# Patient Record
Sex: FEMALE | Race: BLACK OR AFRICAN AMERICAN | Employment: FULL TIME | URBAN - METROPOLITAN AREA
[De-identification: names, ages, dates, MRNs, and addresses within clinical notes are randomized per-mention and may not be internally consistent; named-entity substitution may affect disease eponyms.]

---
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
## 2021-01-06 ENCOUNTER — OFFICE VISIT (OUTPATIENT)
Dept: FAMILY MEDICINE CLINIC | Age: 21
End: 2021-01-06
Payer: COMMERCIAL

## 2021-01-06 VITALS
SYSTOLIC BLOOD PRESSURE: 134 MMHG | BODY MASS INDEX: 43.36 KG/M2 | HEART RATE: 76 BPM | DIASTOLIC BLOOD PRESSURE: 84 MMHG | TEMPERATURE: 97.3 F | OXYGEN SATURATION: 99 % | HEIGHT: 64 IN | RESPIRATION RATE: 18 BRPM | WEIGHT: 254 LBS

## 2021-01-06 DIAGNOSIS — R73.03 PREDIABETES: ICD-10-CM

## 2021-01-06 DIAGNOSIS — E66.01 CLASS 3 SEVERE OBESITY DUE TO EXCESS CALORIES WITHOUT SERIOUS COMORBIDITY WITH BODY MASS INDEX (BMI) OF 40.0 TO 44.9 IN ADULT (HCC): ICD-10-CM

## 2021-01-06 DIAGNOSIS — E66.01 CLASS 3 SEVERE OBESITY WITHOUT SERIOUS COMORBIDITY WITH BODY MASS INDEX (BMI) OF 40.0 TO 44.9 IN ADULT, UNSPECIFIED OBESITY TYPE (HCC): ICD-10-CM

## 2021-01-06 DIAGNOSIS — Z13.220 SCREENING FOR CHOLESTEROL LEVEL: ICD-10-CM

## 2021-01-06 DIAGNOSIS — E28.2 PCOS (POLYCYSTIC OVARIAN SYNDROME): Primary | ICD-10-CM

## 2021-01-06 LAB
ALBUMIN SERPL-MCNC: 3.9 G/DL (ref 3.5–5.2)
ALP BLD-CCNC: 56 U/L (ref 35–104)
ALT SERPL-CCNC: 9 U/L (ref 0–32)
ANION GAP SERPL CALCULATED.3IONS-SCNC: 11 MMOL/L (ref 7–16)
AST SERPL-CCNC: 13 U/L (ref 0–31)
BASOPHILS ABSOLUTE: 0.06 E9/L (ref 0–0.2)
BASOPHILS RELATIVE PERCENT: 0.6 % (ref 0–2)
BILIRUB SERPL-MCNC: 0.2 MG/DL (ref 0–1.2)
BUN BLDV-MCNC: 9 MG/DL (ref 6–20)
CALCIUM SERPL-MCNC: 9.7 MG/DL (ref 8.6–10.2)
CHLORIDE BLD-SCNC: 105 MMOL/L (ref 98–107)
CHOLESTEROL, FASTING: 158 MG/DL (ref 0–199)
CO2: 24 MMOL/L (ref 22–29)
CREAT SERPL-MCNC: 0.5 MG/DL (ref 0.5–1)
EOSINOPHILS ABSOLUTE: 0.1 E9/L (ref 0.05–0.5)
EOSINOPHILS RELATIVE PERCENT: 1 % (ref 0–6)
GFR AFRICAN AMERICAN: >60
GFR NON-AFRICAN AMERICAN: >60 ML/MIN/1.73
GLUCOSE BLD-MCNC: 107 MG/DL (ref 74–99)
HBA1C MFR BLD: 6.7 % (ref 4–5.6)
HCT VFR BLD CALC: 38.4 % (ref 34–48)
HDLC SERPL-MCNC: 52 MG/DL
HEMOGLOBIN: 11.6 G/DL (ref 11.5–15.5)
IMMATURE GRANULOCYTES #: 0.02 E9/L
IMMATURE GRANULOCYTES %: 0.2 % (ref 0–5)
LDL CHOLESTEROL CALCULATED: 92 MG/DL (ref 0–99)
LYMPHOCYTES ABSOLUTE: 3.16 E9/L (ref 1.5–4)
LYMPHOCYTES RELATIVE PERCENT: 30.5 % (ref 20–42)
MCH RBC QN AUTO: 23.3 PG (ref 26–35)
MCHC RBC AUTO-ENTMCNC: 30.2 % (ref 32–34.5)
MCV RBC AUTO: 77.3 FL (ref 80–99.9)
MONOCYTES ABSOLUTE: 0.54 E9/L (ref 0.1–0.95)
MONOCYTES RELATIVE PERCENT: 5.2 % (ref 2–12)
NEUTROPHILS ABSOLUTE: 6.48 E9/L (ref 1.8–7.3)
NEUTROPHILS RELATIVE PERCENT: 62.5 % (ref 43–80)
PDW BLD-RTO: 15.9 FL (ref 11.5–15)
PLATELET # BLD: 341 E9/L (ref 130–450)
PMV BLD AUTO: 11.8 FL (ref 7–12)
POTASSIUM SERPL-SCNC: 4.1 MMOL/L (ref 3.5–5)
RBC # BLD: 4.97 E12/L (ref 3.5–5.5)
SODIUM BLD-SCNC: 140 MMOL/L (ref 132–146)
TOTAL PROTEIN: 7.9 G/DL (ref 6.4–8.3)
TRIGLYCERIDE, FASTING: 72 MG/DL (ref 0–149)
TSH SERPL DL<=0.05 MIU/L-ACNC: 1 UIU/ML (ref 0.27–4.2)
VLDLC SERPL CALC-MCNC: 14 MG/DL
WBC # BLD: 10.4 E9/L (ref 4.5–11.5)

## 2021-01-06 PROCEDURE — 1036F TOBACCO NON-USER: CPT | Performed by: FAMILY MEDICINE

## 2021-01-06 PROCEDURE — G8484 FLU IMMUNIZE NO ADMIN: HCPCS | Performed by: FAMILY MEDICINE

## 2021-01-06 PROCEDURE — G8417 CALC BMI ABV UP PARAM F/U: HCPCS | Performed by: FAMILY MEDICINE

## 2021-01-06 PROCEDURE — 99214 OFFICE O/P EST MOD 30 MIN: CPT | Performed by: FAMILY MEDICINE

## 2021-01-06 PROCEDURE — G8427 DOCREV CUR MEDS BY ELIG CLIN: HCPCS | Performed by: FAMILY MEDICINE

## 2021-01-06 RX ORDER — METFORMIN HYDROCHLORIDE 500 MG/1
500 TABLET, EXTENDED RELEASE ORAL 2 TIMES DAILY
Qty: 60 TABLET | Refills: 5 | Status: SHIPPED
Start: 2021-01-06 | End: 2021-05-25 | Stop reason: SDUPTHER

## 2021-01-06 ASSESSMENT — PATIENT HEALTH QUESTIONNAIRE - PHQ9
SUM OF ALL RESPONSES TO PHQ QUESTIONS 1-9: 0

## 2021-01-06 ASSESSMENT — ENCOUNTER SYMPTOMS
CONSTIPATION: 0
TROUBLE SWALLOWING: 0
SINUS PAIN: 0
SORE THROAT: 0
VOMITING: 0
SINUS PRESSURE: 0
SHORTNESS OF BREATH: 0
COUGH: 0
ABDOMINAL PAIN: 0
NAUSEA: 0
DIARRHEA: 1
EYES NEGATIVE: 1
APNEA: 0

## 2021-01-06 NOTE — PROGRESS NOTES
Subjective:  22 y.o. y/o female presents for f/u obesity and prediabetes. PCOS: Mayo Mejia APRN, reviewed OV 6/20, U/S done and OK, progesterone challenge OK, taking metformin  BID  Appt tomorrow   No bleeding for 4 months then started in November and haven't stopped, +heavy  Feels very sleepy  Not taking the iron  Diagnosed with COVID 12/22, taste and smell still altered, no fevers, no SOA  Works as home health aide   Has a boyfriend  Lives at home alone    PMH, [address], and FH were reviewed and otherwise documented in chart. Review of Systems   Constitutional: Positive for appetite change and fatigue. Negative for activity change, chills, diaphoresis, fever and unexpected weight change. HENT: Positive for hearing loss (issue in family). Negative for congestion, ear pain, sinus pressure, sinus pain, sore throat and trouble swallowing. Eyes: Negative. Respiratory: Negative for apnea, cough and shortness of breath. Cardiovascular: Negative for chest pain, palpitations and leg swelling. Gastrointestinal: Positive for diarrhea (with metformin). Negative for abdominal pain, constipation, nausea and vomiting. Endocrine: Positive for polydipsia. Negative for cold intolerance, heat intolerance, polyphagia and polyuria. Genitourinary: Positive for menstrual problem. Negative for difficulty urinating, dysuria and hematuria. Musculoskeletal: Negative. Skin: Negative. Allergic/Immunologic: Positive for food allergies (plums, throat closes). Negative for environmental allergies and immunocompromised state. Neurological: Positive for headaches (occ). Negative for dizziness, tremors, seizures, weakness, light-headedness and numbness. Hematological: Negative for adenopathy. Does not bruise/bleed easily. Psychiatric/Behavioral: Negative.         Objective: /84   Pulse 76   Temp 97.3 °F (36.3 °C) (Temporal)   Resp 18   Ht 5' 4\" (1.626 m)   Wt 254 lb (115.2 kg)   LMP 11/27/2020 (Exact Date)   SpO2 99%   BMI 43.60 kg/m²   Body mass index is 43.6 kg/m². General:  Patient alert and oriented x 3, NAD, pleasant, overweight-appearing  HEENT:  Atraumatic, normocephalic, pupils equal and normal, EOMI, clear conjunctiva, +mask  Neck:  Supple, no goiter, no carotid bruits, no LAD  Lungs:  CTA B, symmetric breath sounds, no resp distress  Heart:  RRR, no murmurs, gallops or rubs  Abdomen:  Soft/nt/nd, + bowel sounds  Extremities:  No clubbing, cyanosis or edema, DTRs normal b/l  Skin: unremarkable      Assessment/Plan:  Pavel Andre was seen today for menstrual problem. Diagnoses and all orders for this visit:    PCOS (polycystic ovarian syndrome), uncontrolled  -     metFORMIN (GLUCOPHAGE-XR) 500 MG extended release tablet; Take 1 tablet by mouth 2 times daily  + Bleeding for 2 months, not wanting OCP d/t desire for pregnancy  + Keep appt with ob/gyn tomorrow    Prediabetes, due for lab  -     metFORMIN (GLUCOPHAGE-XR) 500 MG extended release tablet; Take 1 tablet by mouth 2 times daily  + Continue current medication(s) along with dietary and lifestyle modifications.  + May need to adjust based on lab results  + Fasting lab today    Class 3 severe obesity due to excess calories without serious comorbidity with body mass index (BMI) of 40.0 to 44.9 in adult [facility]), uncontrolled  + Dietary and lifestyle modifications  Encouraged gym membership and start exercising 3 days/week to start with    Lab today      As above. Call or go to ED immediately if symptoms worsen or persist.    Return in about 6 months (around 7/6/2021). , or sooner if necessary. Educational materials and/or home exercises printed for patient's review and were included in patient instructions on his/her After Visit Summary and given to patient at the end of visit. Counseled regarding above diagnosis, including possible risks and complications,  especially if left uncontrolled. Counseled regarding the possible side effects, risks, benefits and alternatives to treatment; patient and/or guardian verbalizes understanding, agrees, feels comfortable with and wishes to proceed with above treatment plan. Advised patient to call with any new medication issues, and read all Rx info from pharmacy to assure aware of all possible risks and side effects of medication before taking. Reviewed age and gender appropriate health screening exams and vaccinations. Advised patient regarding importance of keeping up with recommended health maintenance and to schedule as soon as possible if overdue, as this is important in assessing for undiagnosed pathology, especially cancer, as well as protecting against potentially harmful/life threatening disease. Patient and/or guardian verbalizes understanding and agrees with above counseling, assessment and plan. All questions answered.

## 2021-01-06 NOTE — PATIENT INSTRUCTIONS
Get a [employer] pass and make a plan to start regularly exercising there at least 3 days per week.

## 2021-01-19 ENCOUNTER — OFFICE VISIT (OUTPATIENT)
Dept: FAMILY MEDICINE CLINIC | Age: 21
End: 2021-01-19
Payer: COMMERCIAL

## 2021-01-19 VITALS
TEMPERATURE: 97.7 F | DIASTOLIC BLOOD PRESSURE: 74 MMHG | HEIGHT: 64 IN | BODY MASS INDEX: 41.96 KG/M2 | RESPIRATION RATE: 20 BRPM | HEART RATE: 78 BPM | SYSTOLIC BLOOD PRESSURE: 126 MMHG | WEIGHT: 245.8 LBS

## 2021-01-19 DIAGNOSIS — Z79.4 TYPE 2 DIABETES MELLITUS WITHOUT COMPLICATION, WITH LONG-TERM CURRENT USE OF INSULIN (HCC): Primary | ICD-10-CM

## 2021-01-19 DIAGNOSIS — E11.9 TYPE 2 DIABETES MELLITUS WITHOUT COMPLICATION, WITH LONG-TERM CURRENT USE OF INSULIN (HCC): Primary | ICD-10-CM

## 2021-01-19 PROCEDURE — 3044F HG A1C LEVEL LT 7.0%: CPT | Performed by: FAMILY MEDICINE

## 2021-01-19 PROCEDURE — G8427 DOCREV CUR MEDS BY ELIG CLIN: HCPCS | Performed by: FAMILY MEDICINE

## 2021-01-19 PROCEDURE — 2022F DILAT RTA XM EVC RTNOPTHY: CPT | Performed by: FAMILY MEDICINE

## 2021-01-19 PROCEDURE — G8417 CALC BMI ABV UP PARAM F/U: HCPCS | Performed by: FAMILY MEDICINE

## 2021-01-19 PROCEDURE — G8484 FLU IMMUNIZE NO ADMIN: HCPCS | Performed by: FAMILY MEDICINE

## 2021-01-19 PROCEDURE — 99213 OFFICE O/P EST LOW 20 MIN: CPT | Performed by: FAMILY MEDICINE

## 2021-01-19 PROCEDURE — 1036F TOBACCO NON-USER: CPT | Performed by: FAMILY MEDICINE

## 2021-01-19 NOTE — PROGRESS NOTES
Subjective:  22 y.o. y/o female is here to discuss new diagnosis of diabetes. Reviewed recent labs with patient, Hgb A1C now DM range from prediabetes  Taking Metformin XR 500mg BID, +loose stools TID and needs to clean up afterwards  Not watching diet  No recent eye exam  No neuropathy symptoms  +PCOS  No regular exercise    Lab Results   Component Value Date    LABA1C 6.7 (H) 01/06/2021       Lab Results   Component Value Date    [address] 01/06/2021        Patient's past medical, surgical, social and/or family history reviewed, updated in chart, and are non-contributory (unless otherwise stated). Medications and allergies also reviewed and updated in chart. Review of Systems:  Constitutional:  No fever, + fatigue, no chills, + headaches, no weight change  Dermatology:  No rash, no mole, no dry or sensitive skin  ENT:  No cough, no sore throat, no sinus pain, no runny nose, no ear pain  Cardiology:  No chest pain, no palpitations, no leg edema, no shortness of breath, no PND  Gastroenterology:  No dysphagia, no abdominal pain, no nausea, no vomiting, no constipation, + diarrhea, no heartburn  Musculoskeletal:  No joint pain, no leg cramps, no back pain, no muscle aches  Neuro:  No dizziness, no numbness/tingling, no weakness  Respiratory:  No shortness of breath, no orthopnea, no wheezing, no ORTIZ  Urology:  No blood in the urine, no urinary frequency, no urinary incontinence, no urinary urgency, no nocturia, no dysuria    Vitals:    01/19/21 0855   BP: 126/74   Pulse: 78   Resp: 20   Temp: 97.7 °F (36.5 °C)   TempSrc: Temporal   Weight: 245 lb 12.8 oz (111.5 kg)   Height: 5' 4\" (1.626 m)     Body mass index is 42.19 kg/m².   General:  Patient alert and oriented x 3, NAD, pleasant  HEENT:  Atraumatic, normocephalic, pupils equal and normal, EOMI, clear conjunctiva,  +mask  Neck:  Supple  Lungs:  no resp distress  Extremities:  No clubbing, cyanosis or edema  Skin: unremarkable    Assessment/Plan: Bean Witt was seen today for discuss labs and diabetes. Diagnoses and all orders for this visit:    Type 2 diabetes mellitus without complication, with long-term current use of insulin ([address] new diagnosis, controlled  -     [facility] - Diabetes Education, [facility]  + Due to diarrhea, will continue on same dosing of metformin. Switch to taking at night to see if diarrhea improves  + DM ed referral  + Discussed pathophysiology of diabetes  + Stressed dietary changes along with regular exercise  + Patient will make eye appt  + Plan for foot exam next visit along with POCT A1C and micro/alb    Declines flu shot    As above. Call or go to ED immediately if symptoms worsen or persist.  Return in about 3 months (around 4/19/2021) for DM2 with POCT A1C and foot exam., or sooner if necessary. Educational materials and/or home exercises printed for patient's review and were included in patient instructions on his/her After Visit Summary and given to patient at the end of visit. Counseled regarding above diagnosis, including possible risks and complications,  especially if left uncontrolled. Counseled regarding the possible side effects, risks, benefits and alternatives to treatment; patient and/or guardian verbalizes understanding, agrees, feels comfortable with and wishes to proceed with above treatment plan. Advised patient to call with any new medication issues, and read all Rx info from pharmacy to assure aware of all possible risks and side effects of medication before taking. Reviewed age and gender appropriate health screening exams and vaccinations. Advised patient regarding importance of keeping up with recommended health maintenance and to schedule as soon as possible if overdue, as this is important in assessing for undiagnosed pathology, especially cancer, as well as protecting against potentially harmful/life threatening disease. Patient and/or guardian verbalizes understanding and agrees with above counseling, assessment and plan. All questions answered.

## 2021-01-19 NOTE — PATIENT INSTRUCTIONS
· Learn how much carbs to eat each day and at each meal. A dietitian or CDE can teach you how to keep track of the amount of carbs you eat. This is called carbohydrate counting. · If you are not sure how to count carbohydrate grams, use the Plate Method to plan meals. It is a good, quick way to make sure that you have a balanced meal. It also helps you spread carbs throughout the day. ? Divide your plate by types of foods. Put non-starchy vegetables on half the plate, meat or other protein food on one-quarter of the plate, and a grain or starchy vegetable in the final quarter of the plate. To this you can add a small piece of fruit and 1 cup of milk or yogurt, depending on how many carbs you are supposed to eat at a meal.  · Try to eat about the same amount of carbs at each meal. Do not \"save up\" your daily allowance of carbs to eat at one meal.  · Proteins have very little or no carbs per serving. Examples of proteins are beef, chicken, turkey, fish, eggs, tofu, cheese, cottage cheese, and peanut butter. A serving size of meat is 3 ounces, which is about the size of a deck of cards. Examples of meat substitute serving sizes (equal to 1 ounce of meat) are 1/4 cup of cottage cheese, 1 egg, 1 tablespoon of peanut butter, and ½ cup of tofu. How can you eat out and still eat healthy? · Learn to estimate the serving sizes of foods that have carbohydrate. If you measure food at home, it will be easier to estimate the amount in a serving of restaurant food. · If the meal you order has too much carbohydrate (such as potatoes, corn, or baked beans), ask to have a low-carbohydrate food instead. Ask for a salad or green vegetables. · If you use insulin, check your blood sugar before and after eating out to help you plan how much to eat in the future. · If you eat more carbohydrate at a meal than you had planned, take a walk or do other exercise. This will help lower your blood sugar. What else should you know? · Limit saturated fat, such as the fat from meat and dairy products. This is a healthy choice because people who have diabetes are at higher risk of heart disease. So choose lean cuts of meat and nonfat or low-fat dairy products. Use olive or canola oil instead of butter or shortening when cooking. · Don't skip meals. Your blood sugar may drop too low if you skip meals and take insulin or certain medicines for diabetes. · Check with your doctor before you drink alcohol. Alcohol can cause your blood sugar to drop too low. Alcohol can also cause a bad reaction if you take certain diabetes medicines. Follow-up care is a key part of your treatment and safety. Be sure to make and go to all appointments, and call your doctor if you are having problems. It's also a good idea to know your test results and keep a list of the medicines you take. Where can you learn more? Go to [email or web address] and sign in to your [employer] account. Enter M395 in the Eko box to learn more about \"Learning About Diabetes Food Guidelines. \"     If you do not have an account, please click on the \"Sign Up Now\" link. Current as of: December 20, 2019               Content Version: 12.6  © 2906-9645 [employer]. Care instructions adapted under license by [facility] ([facility]). If you have questions about a medical condition or this instruction, always ask your healthcare professional. [place] [address] any warranty or liability for your use of this information.

## 2021-04-20 ENCOUNTER — OFFICE VISIT (OUTPATIENT)
Dept: FAMILY MEDICINE CLINIC | Age: 21
End: 2021-04-20
Payer: COMMERCIAL

## 2021-04-20 VITALS
SYSTOLIC BLOOD PRESSURE: 118 MMHG | OXYGEN SATURATION: 98 % | RESPIRATION RATE: 16 BRPM | HEIGHT: 64 IN | TEMPERATURE: 98.3 F | DIASTOLIC BLOOD PRESSURE: 70 MMHG | BODY MASS INDEX: 42.65 KG/M2 | WEIGHT: 249.8 LBS | HEART RATE: 81 BPM

## 2021-04-20 DIAGNOSIS — Z34.90 PREGNANCY, UNSPECIFIED GESTATIONAL AGE: ICD-10-CM

## 2021-04-20 DIAGNOSIS — Z79.4 TYPE 2 DIABETES MELLITUS WITHOUT COMPLICATION, WITH LONG-TERM CURRENT USE OF INSULIN (HCC): Primary | ICD-10-CM

## 2021-04-20 DIAGNOSIS — Z79.4 TYPE 2 DIABETES MELLITUS WITHOUT COMPLICATION, WITH LONG-TERM CURRENT USE OF INSULIN (HCC): ICD-10-CM

## 2021-04-20 DIAGNOSIS — E11.9 TYPE 2 DIABETES MELLITUS WITHOUT COMPLICATION, WITH LONG-TERM CURRENT USE OF INSULIN (HCC): Primary | ICD-10-CM

## 2021-04-20 DIAGNOSIS — E11.9 TYPE 2 DIABETES MELLITUS WITHOUT COMPLICATION, WITH LONG-TERM CURRENT USE OF INSULIN (HCC): ICD-10-CM

## 2021-04-20 LAB
CREATININE URINE: 166 MG/DL (ref 29–226)
HBA1C MFR BLD: 6.4 %
MICROALBUMIN UR-MCNC: 21.3 MG/L
MICROALBUMIN/CREAT UR-RTO: 12.8 (ref 0–30)

## 2021-04-20 PROCEDURE — 83036 HEMOGLOBIN GLYCOSYLATED A1C: CPT | Performed by: FAMILY MEDICINE

## 2021-04-20 PROCEDURE — 1036F TOBACCO NON-USER: CPT | Performed by: FAMILY MEDICINE

## 2021-04-20 PROCEDURE — G8417 CALC BMI ABV UP PARAM F/U: HCPCS | Performed by: FAMILY MEDICINE

## 2021-04-20 PROCEDURE — 99213 OFFICE O/P EST LOW 20 MIN: CPT | Performed by: FAMILY MEDICINE

## 2021-04-20 PROCEDURE — G8427 DOCREV CUR MEDS BY ELIG CLIN: HCPCS | Performed by: FAMILY MEDICINE

## 2021-04-20 PROCEDURE — 2022F DILAT RTA XM EVC RTNOPTHY: CPT | Performed by: FAMILY MEDICINE

## 2021-04-20 PROCEDURE — 3044F HG A1C LEVEL LT 7.0%: CPT | Performed by: FAMILY MEDICINE

## 2021-04-20 NOTE — PROGRESS NOTES
Subjective:  22 y.o. y/o female is here to f/u regarding DM--newly diagnosed. Patient is controlled. Taking all medications and tolerating well--but did stop a couple days ago d/t new pregnancy. Patient is not taking an Ace Inhibitor/ARB. No renal disease. Patient is not taking an aspirin--not indicating. Patient does not have a history of ASCVD and/or known vascular disease. No statin indicated. BP is controlled. No hypoglycemic episodes. No neuropathy. Patient does not see Podiatry regularly. Not yet seen eye doctor. Patient is aware that it is necessary to see an Eye Dr yearly. Patient does not smoke. Most recent labs reviewed with patient. Patient does not have complaints or concerns today. Co-morbidities as below. Lab Results   Component Value Date    LABA1C 6.4 04/20/2021       Lab Results   Component Value Date    LDLCALC 92 01/06/2021      Appt with Dr. Jamison Hand today for new pregnancy  LMP 2/21/21, 8w2d   Decreased appetite for past 2 weeks    Patient's past medical, surgical, social and/or family history reviewed, updated in chart, and are non-contributory (unless otherwise stated). Medications and allergies also reviewed and updated in chart.       Review of Systems:  Constitutional:  No fever, no fatigue, no chills, + headaches, no weight change  Dermatology:  No rash, no mole, no dry or sensitive skin  ENT:  No cough, no sore throat, no sinus pain, no runny nose, no ear pain  Cardiology:  No chest pain, no palpitations, no leg edema, no shortness of breath, no PND  Gastroenterology:  No dysphagia, no abdominal pain, no nausea, no vomiting, no constipation, no diarrhea, no heartburn  Musculoskeletal:  No joint pain, no leg cramps, no back pain, no muscle aches  Neuro:  No dizziness, no numbness/tingling, no weakness  Respiratory:  No shortness of breath, no orthopnea, no wheezing, no ORTIZ  Urology:  No blood in the urine, no urinary frequency, no urinary incontinence, no urinary urgency, no nocturia, no dysuria    Vitals:    04/20/21 0836   BP: 118/70   Site: Left Upper Arm   Position: Sitting   Cuff Size: Large Adult   Pulse: 81   Resp: 16   Temp: 98.3 °F (36.8 °C)   TempSrc: Temporal   SpO2: 98%   Weight: 249 lb 12.8 oz (113.3 kg)   Height: 5' 4\" (1.626 m)     Body mass index is 42.88 kg/m². General:  Patient alert and oriented x 3, NAD, pleasant  HEENT:  Atraumatic, normocephalic, pupils equal and normal, EOMI, clear conjunctiva,  +mask  Neck:  Supple, no goiter, no carotid bruits, no LAD  Lungs:  CTA B, symmetric breath sounds, no resp distress  Heart:  RRR, no murmurs, gallops or rubs  Extremities:  No clubbing, cyanosis or edema  Skin: unremarkable  DM foot exam: Monofilament sensation intact b/l, vibratory sensation intact b/l, proprioception intact, no skin breakdown, no sig callus, normal pulses, normal color      Assessment/Plan:      Anay was seen today for diabetes and diabetic pregnancy. Diagnoses and all orders for this visit:    Type 2 diabetes mellitus without complication, with long-term current use of insulin ([address] controlled, ++pregnancy  -      DIABETES FOOT EXAM  -     Microalbumin / creatinine urine ratio; Future  -     POCT glycosylated hemoglobin (Hb A1C)  -     blood glucose monitor kit and supplies; Dispense insurance-preferred supplies to include: monitor, strips, lancing device, lancets, control solutions, alcohol swabs.  + Continue with metformin for now, discussed possible need for insulin  + Start checking blood sugar daily  + Discussed need for tight control and low-carb diet  + Patient will make appt for eyes    Patient to send message with plan from OB on managing DM--dietician? MFM? Endocrine? Me as PCP? As above. Call or go to ED immediately if symptoms worsen or persist.  Return in about 3 months (around 7/20/2021) for DM., or sooner if necessary.       Educational materials and/or home exercises printed for patient's review and were included in patient instructions on his/her After Visit Summary and given to patient at the end of visit. Counseled regarding above diagnosis, including possible risks and complications,  especially if left uncontrolled. Counseled regarding the possible side effects, risks, benefits and alternatives to treatment; patient and/or guardian verbalizes understanding, agrees, feels comfortable with and wishes to proceed with above treatment plan. Advised patient to call with any new medication issues, and read all Rx info from pharmacy to assure aware of all possible risks and side effects of medication before taking. Reviewed age and gender appropriate health screening exams and vaccinations. Advised patient regarding importance of keeping up with recommended health maintenance and to schedule as soon as possible if overdue, as this is important in assessing for undiagnosed pathology, especially cancer, as well as protecting against potentially harmful/life threatening disease. Patient and/or guardian verbalizes understanding and agrees with above counseling, assessment and plan. All questions answered.

## 2021-05-22 ENCOUNTER — HOSPITAL ENCOUNTER (EMERGENCY)
Age: 21
Discharge: HOME | End: 2021-05-22
Payer: COMMERCIAL

## 2021-05-22 VITALS
HEIGHT: 64 IN | WEIGHT: 180 LBS | SYSTOLIC BLOOD PRESSURE: 125 MMHG | BODY MASS INDEX: 30.73 KG/M2 | HEART RATE: 75 BPM | OXYGEN SATURATION: 99 % | DIASTOLIC BLOOD PRESSURE: 80 MMHG | RESPIRATION RATE: 14 BRPM | TEMPERATURE: 97.1 F

## 2021-05-22 DIAGNOSIS — O23.42 URINARY TRACT INFECTION IN MOTHER DURING SECOND TRIMESTER OF PREGNANCY: ICD-10-CM

## 2021-05-22 DIAGNOSIS — R11.0 NAUSEA: Primary | ICD-10-CM

## 2021-05-22 LAB
ALBUMIN SERPL-MCNC: 3.6 G/DL (ref 3.5–5.2)
ALP BLD-CCNC: 68 U/L (ref 35–104)
ALT SERPL-CCNC: 28 U/L (ref 0–32)
ANION GAP SERPL CALCULATED.3IONS-SCNC: 8 MMOL/L (ref 7–16)
AST SERPL-CCNC: 27 U/L (ref 0–31)
BACTERIA: NORMAL /HPF
BASOPHILS ABSOLUTE: 0.02 E9/L (ref 0–0.2)
BASOPHILS RELATIVE PERCENT: 0.2 % (ref 0–2)
BILIRUB SERPL-MCNC: <0.2 MG/DL (ref 0–1.2)
BILIRUBIN URINE: NEGATIVE
BLOOD, URINE: NEGATIVE
BUN BLDV-MCNC: 11 MG/DL (ref 6–20)
CALCIUM SERPL-MCNC: 10.1 MG/DL (ref 8.6–10.2)
CHLORIDE BLD-SCNC: 104 MMOL/L (ref 98–107)
CLARITY: CLEAR
CO2: 23 MMOL/L (ref 22–29)
COLOR: YELLOW
CREAT SERPL-MCNC: 0.4 MG/DL (ref 0.5–1)
EOSINOPHILS ABSOLUTE: 0.14 E9/L (ref 0.05–0.5)
EOSINOPHILS RELATIVE PERCENT: 1.3 % (ref 0–6)
EPITHELIAL CELLS, UA: NORMAL /HPF
GFR AFRICAN AMERICAN: >60
GFR NON-AFRICAN AMERICAN: >60 ML/MIN/1.73
GLUCOSE BLD-MCNC: 118 MG/DL (ref 74–99)
GLUCOSE URINE: NEGATIVE MG/DL
HCG(URINE) PREGNANCY TEST: POSITIVE
HCT VFR BLD CALC: 31.4 % (ref 34–48)
HEMOGLOBIN: 9.8 G/DL (ref 11.5–15.5)
IMMATURE GRANULOCYTES #: 0.03 E9/L
IMMATURE GRANULOCYTES %: 0.3 % (ref 0–5)
KETONES, URINE: NEGATIVE MG/DL
LEUKOCYTE ESTERASE, URINE: ABNORMAL
LIPASE: 21 U/L (ref 13–60)
LYMPHOCYTES ABSOLUTE: 3.24 E9/L (ref 1.5–4)
LYMPHOCYTES RELATIVE PERCENT: 29.8 % (ref 20–42)
MCH RBC QN AUTO: 23.7 PG (ref 26–35)
MCHC RBC AUTO-ENTMCNC: 31.2 % (ref 32–34.5)
MCV RBC AUTO: 76 FL (ref 80–99.9)
MONOCYTES ABSOLUTE: 0.54 E9/L (ref 0.1–0.95)
MONOCYTES RELATIVE PERCENT: 5 % (ref 2–12)
NEUTROPHILS ABSOLUTE: 6.92 E9/L (ref 1.8–7.3)
NEUTROPHILS RELATIVE PERCENT: 63.4 % (ref 43–80)
NITRITE, URINE: NEGATIVE
PDW BLD-RTO: 15.8 FL (ref 11.5–15)
PH UA: 6 (ref 5–9)
PLATELET # BLD: 290 E9/L (ref 130–450)
PMV BLD AUTO: 10.7 FL (ref 7–12)
POTASSIUM SERPL-SCNC: 4.1 MMOL/L (ref 3.5–5)
PROTEIN UA: NEGATIVE MG/DL
RBC # BLD: 4.13 E12/L (ref 3.5–5.5)
RBC UA: NORMAL /HPF (ref 0–2)
SODIUM BLD-SCNC: 135 MMOL/L (ref 132–146)
SPECIFIC GRAVITY UA: 1.02 (ref 1–1.03)
TOTAL PROTEIN: 7.3 G/DL (ref 6.4–8.3)
UROBILINOGEN, URINE: 1 E.U./DL
WBC # BLD: 10.9 E9/L (ref 4.5–11.5)
WBC UA: NORMAL /HPF (ref 0–5)

## 2021-05-22 PROCEDURE — 83690 ASSAY OF LIPASE: CPT

## 2021-05-22 PROCEDURE — 2580000003 HC RX 258: Performed by: NURSE PRACTITIONER

## 2021-05-22 PROCEDURE — 6370000000 HC RX 637 (ALT 250 FOR IP): Performed by: NURSE PRACTITIONER

## 2021-05-22 PROCEDURE — 85025 COMPLETE CBC W/AUTO DIFF WBC: CPT

## 2021-05-22 PROCEDURE — 96374 THER/PROPH/DIAG INJ IV PUSH: CPT

## 2021-05-22 PROCEDURE — 87088 URINE BACTERIA CULTURE: CPT

## 2021-05-22 PROCEDURE — 81001 URINALYSIS AUTO W/SCOPE: CPT

## 2021-05-22 PROCEDURE — 6360000002 HC RX W HCPCS: Performed by: NURSE PRACTITIONER

## 2021-05-22 PROCEDURE — 80053 COMPREHEN METABOLIC PANEL: CPT

## 2021-05-22 PROCEDURE — 99284 EMERGENCY DEPT VISIT MOD MDM: CPT

## 2021-05-22 PROCEDURE — 81025 URINE PREGNANCY TEST: CPT

## 2021-05-22 RX ORDER — NITROFURANTOIN 25; 75 MG/1; MG/1
100 CAPSULE ORAL ONCE
Status: COMPLETED | OUTPATIENT
Start: 2021-05-22 | End: 2021-05-22

## 2021-05-22 RX ORDER — ONDANSETRON 4 MG/1
4 TABLET, ORALLY DISINTEGRATING ORAL EVERY 8 HOURS PRN
Qty: 4 TABLET | Refills: 0 | Status: ON HOLD | OUTPATIENT
Start: 2021-05-22 | End: 2021-11-24 | Stop reason: HOSPADM

## 2021-05-22 RX ORDER — ONDANSETRON 2 MG/ML
4 INJECTION INTRAMUSCULAR; INTRAVENOUS ONCE
Status: COMPLETED | OUTPATIENT
Start: 2021-05-22 | End: 2021-05-22

## 2021-05-22 RX ORDER — NITROFURANTOIN 25; 75 MG/1; MG/1
100 CAPSULE ORAL 2 TIMES DAILY
Qty: 10 CAPSULE | Refills: 0 | Status: SHIPPED | OUTPATIENT
Start: 2021-05-22 | End: 2021-05-27

## 2021-05-22 RX ORDER — 0.9 % SODIUM CHLORIDE 0.9 %
1000 INTRAVENOUS SOLUTION INTRAVENOUS ONCE
Status: COMPLETED | OUTPATIENT
Start: 2021-05-22 | End: 2021-05-22

## 2021-05-22 RX ADMIN — SODIUM CHLORIDE 1000 ML: 9 INJECTION, SOLUTION INTRAVENOUS at 20:49

## 2021-05-22 RX ADMIN — ONDANSETRON 4 MG: 2 INJECTION INTRAMUSCULAR; INTRAVENOUS at 20:50

## 2021-05-22 RX ADMIN — NITROFURANTOIN (MONOHYDRATE/MACROCRYSTALS) 100 MG: 75; 25 CAPSULE ORAL at 22:09

## 2021-05-22 ASSESSMENT — PAIN SCALES - GENERAL: PAINLEVEL_OUTOF10: 0

## 2021-05-23 NOTE — ED PROVIDER NOTES
[facility]  Department of Emergency Medicine   ED  Encounter Note  Admit Date/RoomTime: 2021  8:11 PM  ED Room:     NAME: Asia Russo  : 2000  MRN: [id number]     Chief Complaint:  Nausea (Nausea + decreased appetite x4 days. Pt is 13 wks preg. Pt denies abd pain, bleeding, etc)    History of Present Illness        Asia Russo is a 22 y.o. old female who presents to the emergency department by private vehicle, with persistent of nausea and decreased appetite which began 4 day(s) prior to arrival.  There has been no similar episodes in the past.  She states she has a  with regular prenatal care at approximately 13 weeks of gestation. She states that she has had an ultrasound in her OB/GYN's office that confirmed IUP. The symptoms are associated with none. Since inset the symptoms have been intermittent. Her symptoms are aggravated by eating and liquids and relieved by nothing. There has been no additional symptoms of headache, fever, chills, chest pain, shortness of breath, cough, abdominal pain, pelvic pain, vomiting, diarrhea, constipation, dysuria, hematuria, vaginal bleeding, or vaginal discharge. ROS   Pertinent positives and negatives are stated within HPI, all other systems reviewed and are negative. Past Medical History:  has a past medical history of Diabetes mellitus ([address] and PCOS (polycystic ovarian syndrome). Surgical History:  has no past surgical history on file. Social History:  reports that she has never smoked. She has never used smokeless tobacco. She reports that she does not drink alcohol and does not use drugs. Family History: family history includes Breast Cancer in her maternal aunt; Diabetes in her maternal grandfather; High Blood Pressure in her mother; No Known Problems in her sister and sister; Other in her mother; Stroke in her mother.      Allergies: Penicillins and Plum pulp    Physical Exam   Oxygen AST 27 0 - 31 U/L   CBC Auto Differential   Result Value Ref Range    WBC 10.9 4.5 - 11.5 E9/L    RBC 4.13 3.50 - 5.50 E12/L    Hemoglobin 9.8 (L) 11.5 - 15.5 g/dL    Hematocrit 31.4 (L) 34.0 - 48.0 %    MCV 76.0 (L) 80.0 - 99.9 fL    MCH 23.7 (L) 26.0 - 35.0 pg    MCHC 31.2 (L) 32.0 - 34.5 %    RDW 15.8 (H) 11.5 - 15.0 fL    Platelets 950 115 - 080 E9/L    MPV 10.7 7.0 - 12.0 fL    Neutrophils % 63.4 43.0 - 80.0 %    Immature Granulocytes % 0.3 0.0 - 5.0 %    Lymphocytes % 29.8 20.0 - 42.0 %    Monocytes % 5.0 2.0 - 12.0 %    Eosinophils % 1.3 0.0 - 6.0 %    Basophils % 0.2 0.0 - 2.0 %    Neutrophils Absolute 6.92 1.80 - 7.30 E9/L    Immature Granulocytes # 0.03 E9/L    Lymphocytes Absolute 3.24 1.50 - 4.00 E9/L    Monocytes Absolute 0.54 0.10 - 0.95 E9/L    Eosinophils Absolute 0.14 0.05 - 0.50 E9/L    Basophils Absolute 0.02 0.00 - 0.20 E9/L   Lipase   Result Value Ref Range    Lipase 21 13 - 60 U/L   Urinalysis   Result Value Ref Range    Color, UA Yellow Straw/Yellow    Clarity, UA Clear Clear    Glucose, Ur Negative Negative mg/dL    Bilirubin Urine Negative Negative    Ketones, Urine Negative Negative mg/dL    Specific Gravity, UA 1.020 1.005 - 1.030    Blood, Urine Negative Negative    pH, UA 6.0 5.0 - 9.0    Protein, UA Negative Negative mg/dL    Urobilinogen, Urine 1.0 <2.0 E.U./dL    Nitrite, Urine Negative Negative    Leukocyte Esterase, Urine MODERATE (A) Negative   Pregnancy, Urine   Result Value Ref Range    HCG(Urine) Pregnancy Test POSITIVE NEGATIVE   Microscopic Urinalysis   Result Value Ref Range    WBC, UA 2-5 0 - 5 /HPF    RBC, UA NONE 0 - 2 /HPF    Epithelial Cells, UA FEW /HPF    Bacteria, UA NONE SEEN None Seen /HPF     Imaging: All Radiology results interpreted by Radiologist unless otherwise noted.   No orders to display     ED Course / Medical Decision Making     Medications   nitrofurantoin (macrocrystal-monohydrate) (MACROBID) capsule 100 mg (has no administration in time range)   0.9

## 2021-05-25 DIAGNOSIS — E28.2 PCOS (POLYCYSTIC OVARIAN SYNDROME): ICD-10-CM

## 2021-05-25 DIAGNOSIS — R73.03 PREDIABETES: ICD-10-CM

## 2021-05-25 LAB — URINE CULTURE, ROUTINE: NORMAL

## 2021-05-25 RX ORDER — METFORMIN HYDROCHLORIDE 500 MG/1
500 TABLET, EXTENDED RELEASE ORAL 2 TIMES DAILY
Qty: 60 TABLET | Refills: 1 | Status: ON HOLD
Start: 2021-05-25 | End: 2021-08-03

## 2021-08-03 ENCOUNTER — HOSPITAL ENCOUNTER (OUTPATIENT)
Age: 21
Discharge: HOME | End: 2021-08-03
Attending: OBSTETRICS & GYNECOLOGY | Admitting: OBSTETRICS & GYNECOLOGY
Payer: COMMERCIAL

## 2021-08-03 PROBLEM — N92.0 SPOTTING: Status: ACTIVE | Noted: 2021-08-03

## 2021-08-03 PROCEDURE — 99213 OFFICE O/P EST LOW 20 MIN: CPT | Performed by: MIDWIFE

## 2021-08-03 PROCEDURE — 99202 OFFICE O/P NEW SF 15 MIN: CPT

## 2021-08-03 RX ORDER — INSULIN GLARGINE 100 [IU]/ML
6 INJECTION, SOLUTION SUBCUTANEOUS NIGHTLY
Status: ON HOLD | COMMUNITY
End: 2021-11-24 | Stop reason: HOSPADM

## 2021-08-03 NOTE — PROGRESS NOTES
Discharge instructions given to patient. Patient states that she verbally understands instructions. Patient educated on  labor signs and when to return to unit. Patient has no questions at this time. Patient ambulated off floor alone.

## 2021-08-03 NOTE — H&P
Department of Obstetrics and Gynecology   Obstetrics History and Physical      CHIEF COMPLAINT:  vaginal bleeding    HISTORY OF PRESENT ILLNESS:      The patient is a 22 y.o. Zenia Vazquez at 22 weeks 0 days  Patient presents with a chief complaint as above and is being admitted for post coital vaginal bleeding. Pt states she had pink bleeding at 0400 after intercourse and pink discharge at 0900, No further bleeding/discharge. NO LOF/CTX. DATES:    Patient's last menstrual period was 2021. Estimated Date of Delivery: 21    PRENATAL CARE:    Provider:  Sarah Godfrey    PAST OB HISTORY        Depression:  No      Post-partum depression:  No      Diabetes:  Type 2 DM      Gestational Diabetes:  No      Thyroid Disease:  No      Chronic HTN:  No      Gestation HTN:  No      Pre-eclampsia:  No      Seizure disorder:  No      Asthma:  No      Clotting disorder:  No      :  No      Tubal ligation:  No      D & C:  No      Cerclage:  No      LEEP:  No      Myomectomy:  No    OB History    Para Term  AB Living   1 0 0 0 0 0   SAB TAB Ectopic Molar Multiple Live Births   0 0 0 0 0 0      # Outcome Date GA Lbr Francis/2nd Weight Sex Delivery Anes PTL Lv   1 Current                    Past Medical History:        Diagnosis Date    Diabetes mellitus ([facility] [address]     type 2    PCOS (polycystic ovarian syndrome)      Past Surgical History:    No past surgical history on file.   Social History:    Denies smoking drugs, or alcohol uses  Family History:       Problem Relation Age of Onset    Other Mother         Dermatomyositis    High Blood Pressure Mother     Stroke Mother     No Known Problems Sister     Diabetes Maternal Grandfather     No Known Problems Sister     Breast Cancer Maternal Aunt      Medications Prior to Admission:  Medications Prior to Admission: insulin glargine (LANTUS) 100 UNIT/ML injection vial, Inject 6 Units into the skin nightly  [DISCONTINUED] metFORMIN (GLUCOPHAGE-XR) 500 MG extended release tablet, Take 1 tablet by mouth 2 times daily  ondansetron (ZOFRAN ODT) 4 MG disintegrating tablet, Take 1 tablet by mouth every 8 hours as needed for Nausea or Vomiting  blood glucose monitor kit and supplies, Dispense insurance-preferred supplies to include: monitor, strips, lancing device, lancets, control solutions, alcohol swabs. Allergies:  Penicillins and Plum pulp        PHYSICAL EXAM:    VITALS:  LMP 01/16/2021       General appearance:  awake, alert, cooperative, no apparent distress, and appears stated age  Neurologic:  Awake, alert, oriented to name, place and time.     Lungs:  No increased work of breathing, good air exchange, clear to auscultation bilaterally, no crackles or wheezing  Heart:  Normal apical impulse, regular rate and rhythm  Fetal heart rate:  Baseline Heart Rate 130  Pelvis:  External Genitalia: General appearance; normal, Hair distribution; normal, Lesions absent  Vagina: Pelvic support normal, no bleeding visualized   Cervix: Closed per spec exam    Contraction frequency:  0 minutes  Membranes:  Intact      ASSESSMENT AND PLAN:  Active Problems:    Spotting  Plan: Discussed with Dr. Ryan Timmons  Pelvic rest until next appt  May discharge to home      [address]

## 2021-08-03 NOTE — PROGRESS NOTES
22.0    Here for spotting after intercourse. Pt reports intercourse at 0400, woke up to use bathroom and had pink spotting. Denies any cramping or contractions. Reports fetal movement.      Type 2 diabetic on lantus nightly    FHTs 130's

## 2021-08-24 ENCOUNTER — HOSPITAL ENCOUNTER (OUTPATIENT)
Age: 21
Discharge: HOME HEALTH | End: 2021-08-25
Attending: OBSTETRICS & GYNECOLOGY | Admitting: OBSTETRICS & GYNECOLOGY
Payer: COMMERCIAL

## 2021-08-24 VITALS
HEIGHT: 63 IN | WEIGHT: 230 LBS | RESPIRATION RATE: 16 BRPM | SYSTOLIC BLOOD PRESSURE: 121 MMHG | DIASTOLIC BLOOD PRESSURE: 60 MMHG | HEART RATE: 91 BPM | BODY MASS INDEX: 40.75 KG/M2 | TEMPERATURE: 98.1 F

## 2021-08-24 PROCEDURE — 99234 HOSP IP/OBS SM DT SF/LOW 45: CPT | Performed by: MIDWIFE

## 2021-08-25 PROBLEM — O36.8190 DECREASED FETAL MOVEMENT AFFECTING MANAGEMENT OF MOTHER, ANTEPARTUM: Status: ACTIVE | Noted: 2021-08-25

## 2021-08-25 PROCEDURE — 99211 OFF/OP EST MAY X REQ PHY/QHP: CPT

## 2021-08-25 NOTE — H&P
Department of Obstetrics and Gynecology   Obstetrics History and Physical      CHIEF COMPLAINT:  decreased fetal movement    HISTORY OF PRESENT ILLNESS:      The patient is a 22 y.o. Merlin Ferron at 25 weeks 0 days  Patient presents with a chief complaint as above and is being admitted for decreased fetal movement this evening. Pt reports + FM at this time. No lof/vb/ctx. Type 2 DM with insulin    DATES:    Patient's last menstrual period was 2021. Estimated Date of Delivery: 21    PRENATAL CARE:    Provider:  Betsy Pryor      PAST OB HISTORY        Depression:  No      Post-partum depression:  No      Diabetes:  Type 2 DM, insulin      Gestational Diabetes:  No      Thyroid Disease:  No      Chronic HTN:  No      Gestation HTN:  No      Pre-eclampsia:  No      Seizure disorder:  No      Asthma:  No      Clotting disorder:  No      :  No      Tubal ligation:  No      D & C:  No      Cerclage:  No      LEEP:  No      Myomectomy:  No    OB History    Para Term  AB Living   1 0 0 0 0 0   SAB TAB Ectopic Molar Multiple Live Births   0 0 0 0 0 0      # Outcome Date GA Lbr Francis/2nd Weight Sex Delivery Anes PTL Lv   1 Current                    Past Medical History:        Diagnosis Date    Diabetes mellitus ([facility] [address]     type 2    PCOS (polycystic ovarian syndrome)      Past Surgical History:    No past surgical history on file.   Social History:    Denies smoking, drugs or alcohol use  Family History:       Problem Relation Age of Onset    Other Mother         Dermatomyositis    High Blood Pressure Mother     Stroke Mother     No Known Problems Sister     Diabetes Maternal Grandfather     No Known Problems Sister     Breast Cancer Maternal Aunt      Medications Prior to Admission:  Medications Prior to Admission: insulin glargine (LANTUS) 100 UNIT/ML injection vial, Inject 6 Units into the skin nightly  ondansetron (ZOFRAN ODT) 4 MG disintegrating tablet, Take 1 tablet by mouth every 8 hours as needed for Nausea or Vomiting  blood glucose monitor kit and supplies, Dispense insurance-preferred supplies to include: monitor, strips, lancing device, lancets, control solutions, alcohol swabs. Allergies:  Penicillins and Plum pulp        PHYSICAL EXAM:    VITALS:  /60   Pulse 91   Temp 98.1 °F (36.7 °C)   Resp 16   Ht 5' 3\" (1.6 m)   Wt 230 lb (104.3 kg)   LMP 01/16/2021   BMI 40.74 kg/m²       General appearance:  awake, alert, cooperative, no apparent distress, and appears stated age  Neurologic:  Awake, alert, oriented to name, place and time.     Lungs:  No increased work of breathing, good air exchange, clear to auscultation bilaterally, no crackles or wheezing  Heart:  Normal apical impulse, regular rate and rhythm  Abdomen:  Gravid, soft, nontender  Fetal heart rate:  Baseline Heart Rate 125, accelerations:  present  long term variability:  moderate  decelerations:  absent  Pelvis:  Deferred    Contraction frequency:  0 minutes  Membranes:  Intact      ASSESSMENT AND PLAN:  Active Problems:    IUP @ 25 weeks gestation    Decreased Fetal Movement    Reactive  Discussed with Dr. Randal Dangelo  May discharge to home    [address]

## 2021-08-25 NOTE — PROGRESS NOTES
25 weeks    Patient presents to antepartum due to decreased fetal movement that began today. She states she feels movement just not as much as usual.  Denies LOF, and VB.   Placed on EFM

## 2021-11-22 ENCOUNTER — ANESTHESIA EVENT (OUTPATIENT)
Dept: LABOR AND DELIVERY | Age: 21
DRG: 560 | End: 2021-11-22
Payer: COMMERCIAL

## 2021-11-22 ENCOUNTER — ANESTHESIA (OUTPATIENT)
Dept: LABOR AND DELIVERY | Age: 21
DRG: 560 | End: 2021-11-22
Payer: COMMERCIAL

## 2021-11-22 ENCOUNTER — HOSPITAL ENCOUNTER (INPATIENT)
Age: 21
LOS: 2 days | Discharge: HOME | DRG: 560 | End: 2021-11-24
Attending: OBSTETRICS & GYNECOLOGY | Admitting: OBSTETRICS & GYNECOLOGY
Payer: COMMERCIAL

## 2021-11-22 DIAGNOSIS — G89.18 PAIN FOLLOWING SURGERY OR PROCEDURE: Primary | ICD-10-CM

## 2021-11-22 PROBLEM — O42.90 AMNIOTIC FLUID LEAKING: Status: ACTIVE | Noted: 2021-11-22

## 2021-11-22 PROBLEM — Z3A.37 37 WEEKS GESTATION OF PREGNANCY: Status: ACTIVE | Noted: 2021-11-22

## 2021-11-22 LAB
ABO/RH: NORMAL
ALBUMIN SERPL-MCNC: 3 G/DL (ref 3.5–5.2)
ALP BLD-CCNC: 148 U/L (ref 35–104)
ALT SERPL-CCNC: 27 U/L (ref 0–32)
AMNISURE, POC: POSITIVE
AMPHETAMINE SCREEN, URINE: NOT DETECTED
ANION GAP SERPL CALCULATED.3IONS-SCNC: 8 MMOL/L (ref 7–16)
ANTIBODY SCREEN: NORMAL
AST SERPL-CCNC: 27 U/L (ref 0–31)
BARBITURATE SCREEN URINE: NOT DETECTED
BENZODIAZEPINE SCREEN, URINE: NOT DETECTED
BILIRUB SERPL-MCNC: <0.2 MG/DL (ref 0–1.2)
BUN BLDV-MCNC: 6 MG/DL (ref 6–20)
CALCIUM SERPL-MCNC: 9.3 MG/DL (ref 8.6–10.2)
CANNABINOID SCREEN URINE: NOT DETECTED
CHLORIDE BLD-SCNC: 105 MMOL/L (ref 98–107)
CO2: 22 MMOL/L (ref 22–29)
COCAINE METABOLITE SCREEN URINE: NOT DETECTED
CREAT SERPL-MCNC: 0.3 MG/DL (ref 0.5–1)
FENTANYL SCREEN, URINE: NOT DETECTED
GFR AFRICAN AMERICAN: >60
GFR NON-AFRICAN AMERICAN: >60 ML/MIN/1.73
GLUCOSE BLD-MCNC: 91 MG/DL (ref 74–99)
HCT VFR BLD CALC: 30.3 % (ref 34–48)
HCT VFR BLD CALC: 31.9 % (ref 34–48)
HEMOGLOBIN: 10.1 G/DL (ref 11.5–15.5)
HEMOGLOBIN: 9.6 G/DL (ref 11.5–15.5)
Lab: NORMAL
Lab: NORMAL
MCH RBC QN AUTO: 23.4 PG (ref 26–35)
MCH RBC QN AUTO: 23.4 PG (ref 26–35)
MCHC RBC AUTO-ENTMCNC: 31.7 % (ref 32–34.5)
MCHC RBC AUTO-ENTMCNC: 31.7 % (ref 32–34.5)
MCV RBC AUTO: 73.7 FL (ref 80–99.9)
MCV RBC AUTO: 74 FL (ref 80–99.9)
METHADONE SCREEN, URINE: NOT DETECTED
NEGATIVE QC PASS/FAIL: NORMAL
OPIATE SCREEN URINE: NOT DETECTED
OXYCODONE URINE: NOT DETECTED
PDW BLD-RTO: 14.8 FL (ref 11.5–15)
PDW BLD-RTO: 14.8 FL (ref 11.5–15)
PHENCYCLIDINE SCREEN URINE: NOT DETECTED
PLATELET # BLD: 211 E9/L (ref 130–450)
PLATELET # BLD: 258 E9/L (ref 130–450)
PMV BLD AUTO: 10.8 FL (ref 7–12)
PMV BLD AUTO: 11.5 FL (ref 7–12)
POSITIVE QC PASS/FAIL: NORMAL
POTASSIUM SERPL-SCNC: 3.9 MMOL/L (ref 3.5–5)
RBC # BLD: 4.11 E12/L (ref 3.5–5.5)
RBC # BLD: 4.31 E12/L (ref 3.5–5.5)
SODIUM BLD-SCNC: 135 MMOL/L (ref 132–146)
TOTAL PROTEIN: 6.8 G/DL (ref 6.4–8.3)
URIC ACID, SERUM: 3.8 MG/DL (ref 2.4–5.7)
WBC # BLD: 8.9 E9/L (ref 4.5–11.5)
WBC # BLD: 9.7 E9/L (ref 4.5–11.5)

## 2021-11-22 PROCEDURE — 51701 INSERT BLADDER CATHETER: CPT

## 2021-11-22 PROCEDURE — 84550 ASSAY OF BLOOD/URIC ACID: CPT

## 2021-11-22 PROCEDURE — 86900 BLOOD TYPING SEROLOGIC ABO: CPT

## 2021-11-22 PROCEDURE — 84112 EVAL AMNIOTIC FLUID PROTEIN: CPT

## 2021-11-22 PROCEDURE — 3700000025 EPIDURAL BLOCK: Performed by: ANESTHESIOLOGY

## 2021-11-22 PROCEDURE — 2500000003 HC RX 250 WO HCPCS

## 2021-11-22 PROCEDURE — 6360000002 HC RX W HCPCS: Performed by: OBSTETRICS & GYNECOLOGY

## 2021-11-22 PROCEDURE — 80053 COMPREHEN METABOLIC PANEL: CPT

## 2021-11-22 PROCEDURE — 6370000000 HC RX 637 (ALT 250 FOR IP): Performed by: OBSTETRICS & GYNECOLOGY

## 2021-11-22 PROCEDURE — 2580000003 HC RX 258: Performed by: OBSTETRICS & GYNECOLOGY

## 2021-11-22 PROCEDURE — 80307 DRUG TEST PRSMV CHEM ANLYZR: CPT

## 2021-11-22 PROCEDURE — 86901 BLOOD TYPING SEROLOGIC RH(D): CPT

## 2021-11-22 PROCEDURE — 36415 COLL VENOUS BLD VENIPUNCTURE: CPT

## 2021-11-22 PROCEDURE — 87591 N.GONORRHOEAE DNA AMP PROB: CPT

## 2021-11-22 PROCEDURE — 7200000001 HC VAGINAL DELIVERY

## 2021-11-22 PROCEDURE — 86850 RBC ANTIBODY SCREEN: CPT

## 2021-11-22 PROCEDURE — 1220000000 HC SEMI PRIVATE OB R&B

## 2021-11-22 PROCEDURE — 2500000003 HC RX 250 WO HCPCS: Performed by: ANESTHESIOLOGY

## 2021-11-22 PROCEDURE — 85027 COMPLETE CBC AUTOMATED: CPT

## 2021-11-22 PROCEDURE — 87491 CHLMYD TRACH DNA AMP PROBE: CPT

## 2021-11-22 RX ORDER — NALOXONE HYDROCHLORIDE 0.4 MG/ML
0.4 INJECTION, SOLUTION INTRAMUSCULAR; INTRAVENOUS; SUBCUTANEOUS PRN
Status: DISCONTINUED | OUTPATIENT
Start: 2021-11-22 | End: 2021-11-22 | Stop reason: HOSPADM

## 2021-11-22 RX ORDER — HYDRALAZINE HYDROCHLORIDE 20 MG/ML
5 INJECTION INTRAMUSCULAR; INTRAVENOUS ONCE
Status: COMPLETED | OUTPATIENT
Start: 2021-11-22 | End: 2021-11-22

## 2021-11-22 RX ORDER — LIDOCAINE HYDROCHLORIDE 10 MG/ML
INJECTION, SOLUTION INFILTRATION; PERINEURAL
Status: COMPLETED
Start: 2021-11-22 | End: 2021-11-22

## 2021-11-22 RX ORDER — ONDANSETRON 2 MG/ML
4 INJECTION INTRAMUSCULAR; INTRAVENOUS EVERY 6 HOURS PRN
Status: DISCONTINUED | OUTPATIENT
Start: 2021-11-22 | End: 2021-11-22 | Stop reason: HOSPADM

## 2021-11-22 RX ORDER — SODIUM CHLORIDE, SODIUM LACTATE, POTASSIUM CHLORIDE, AND CALCIUM CHLORIDE .6; .31; .03; .02 G/100ML; G/100ML; G/100ML; G/100ML
1000 INJECTION, SOLUTION INTRAVENOUS PRN
Status: DISCONTINUED | OUTPATIENT
Start: 2021-11-22 | End: 2021-11-24 | Stop reason: HOSPADM

## 2021-11-22 RX ORDER — NALBUPHINE HCL 10 MG/ML
5 AMPUL (ML) INJECTION EVERY 4 HOURS PRN
Status: DISCONTINUED | OUTPATIENT
Start: 2021-11-22 | End: 2021-11-22 | Stop reason: HOSPADM

## 2021-11-22 RX ORDER — HYDROCODONE BITARTRATE AND ACETAMINOPHEN 5; 325 MG/1; MG/1
1 TABLET ORAL EVERY 4 HOURS PRN
Status: DISCONTINUED | OUTPATIENT
Start: 2021-11-22 | End: 2021-11-24 | Stop reason: HOSPADM

## 2021-11-22 RX ORDER — SODIUM CHLORIDE 0.9 % (FLUSH) 0.9 %
5-40 SYRINGE (ML) INJECTION EVERY 12 HOURS SCHEDULED
Status: DISCONTINUED | OUTPATIENT
Start: 2021-11-22 | End: 2021-11-24 | Stop reason: HOSPADM

## 2021-11-22 RX ORDER — ONDANSETRON 2 MG/ML
4 INJECTION INTRAMUSCULAR; INTRAVENOUS EVERY 6 HOURS PRN
Status: DISCONTINUED | OUTPATIENT
Start: 2021-11-22 | End: 2021-11-24 | Stop reason: HOSPADM

## 2021-11-22 RX ORDER — LIDOCAINE HYDROCHLORIDE 10 MG/ML
INJECTION, SOLUTION EPIDURAL; INFILTRATION; INTRACAUDAL; PERINEURAL PRN
Status: DISCONTINUED | OUTPATIENT
Start: 2021-11-22 | End: 2021-11-22 | Stop reason: SDUPTHER

## 2021-11-22 RX ORDER — IBUPROFEN 600 MG/1
600 TABLET ORAL EVERY 6 HOURS PRN
Status: DISCONTINUED | OUTPATIENT
Start: 2021-11-22 | End: 2021-11-24 | Stop reason: HOSPADM

## 2021-11-22 RX ORDER — ACETAMINOPHEN 325 MG/1
650 TABLET ORAL EVERY 4 HOURS PRN
Status: DISCONTINUED | OUTPATIENT
Start: 2021-11-22 | End: 2021-11-24 | Stop reason: HOSPADM

## 2021-11-22 RX ORDER — SODIUM CHLORIDE, SODIUM LACTATE, POTASSIUM CHLORIDE, AND CALCIUM CHLORIDE .6; .31; .03; .02 G/100ML; G/100ML; G/100ML; G/100ML
500 INJECTION, SOLUTION INTRAVENOUS PRN
Status: DISCONTINUED | OUTPATIENT
Start: 2021-11-22 | End: 2021-11-24 | Stop reason: HOSPADM

## 2021-11-22 RX ORDER — ONDANSETRON 4 MG/1
4 TABLET, ORALLY DISINTEGRATING ORAL EVERY 8 HOURS PRN
Status: DISCONTINUED | OUTPATIENT
Start: 2021-11-22 | End: 2021-11-24 | Stop reason: HOSPADM

## 2021-11-22 RX ORDER — MODIFIED LANOLIN
OINTMENT (GRAM) TOPICAL PRN
Status: DISCONTINUED | OUTPATIENT
Start: 2021-11-22 | End: 2021-11-24 | Stop reason: HOSPADM

## 2021-11-22 RX ORDER — SODIUM CHLORIDE 9 MG/ML
25 INJECTION, SOLUTION INTRAVENOUS PRN
Status: DISCONTINUED | OUTPATIENT
Start: 2021-11-22 | End: 2021-11-24 | Stop reason: HOSPADM

## 2021-11-22 RX ORDER — HYDROCODONE BITARTRATE AND ACETAMINOPHEN 5; 325 MG/1; MG/1
2 TABLET ORAL EVERY 4 HOURS PRN
Status: DISCONTINUED | OUTPATIENT
Start: 2021-11-22 | End: 2021-11-24 | Stop reason: HOSPADM

## 2021-11-22 RX ORDER — LIDOCAINE HYDROCHLORIDE AND EPINEPHRINE 15; 5 MG/ML; UG/ML
INJECTION, SOLUTION EPIDURAL PRN
Status: DISCONTINUED | OUTPATIENT
Start: 2021-11-22 | End: 2021-11-22 | Stop reason: SDUPTHER

## 2021-11-22 RX ORDER — SODIUM CHLORIDE 0.9 % (FLUSH) 0.9 %
5-40 SYRINGE (ML) INJECTION PRN
Status: DISCONTINUED | OUTPATIENT
Start: 2021-11-22 | End: 2021-11-24 | Stop reason: HOSPADM

## 2021-11-22 RX ORDER — DOCUSATE SODIUM 100 MG/1
100 CAPSULE, LIQUID FILLED ORAL 2 TIMES DAILY
Status: DISCONTINUED | OUTPATIENT
Start: 2021-11-22 | End: 2021-11-24 | Stop reason: HOSPADM

## 2021-11-22 RX ORDER — ACETAMINOPHEN 650 MG
TABLET, EXTENDED RELEASE ORAL
Status: COMPLETED
Start: 2021-11-22 | End: 2021-11-22

## 2021-11-22 RX ORDER — SODIUM CHLORIDE, SODIUM LACTATE, POTASSIUM CHLORIDE, CALCIUM CHLORIDE 600; 310; 30; 20 MG/100ML; MG/100ML; MG/100ML; MG/100ML
INJECTION, SOLUTION INTRAVENOUS CONTINUOUS
Status: DISCONTINUED | OUTPATIENT
Start: 2021-11-22 | End: 2021-11-24 | Stop reason: HOSPADM

## 2021-11-22 RX ORDER — FERROUS SULFATE 325(65) MG
325 TABLET ORAL 2 TIMES DAILY WITH MEALS
Status: DISCONTINUED | OUTPATIENT
Start: 2021-11-22 | End: 2021-11-24 | Stop reason: HOSPADM

## 2021-11-22 RX ORDER — SIMETHICONE 80 MG
80 TABLET,CHEWABLE ORAL EVERY 6 HOURS PRN
Status: DISCONTINUED | OUTPATIENT
Start: 2021-11-22 | End: 2021-11-24 | Stop reason: HOSPADM

## 2021-11-22 RX ORDER — SODIUM CHLORIDE, SODIUM LACTATE, POTASSIUM CHLORIDE, CALCIUM CHLORIDE 600; 310; 30; 20 MG/100ML; MG/100ML; MG/100ML; MG/100ML
INJECTION, SOLUTION INTRAVENOUS CONTINUOUS
Status: DISCONTINUED | OUTPATIENT
Start: 2021-11-22 | End: 2021-11-22 | Stop reason: SDUPTHER

## 2021-11-22 RX ADMIN — LIDOCAINE HYDROCHLORIDE 200 MG: 10 INJECTION, SOLUTION INFILTRATION; PERINEURAL at 18:23

## 2021-11-22 RX ADMIN — VANCOMYCIN HYDROCHLORIDE 1000 MG: 1 INJECTION, POWDER, LYOPHILIZED, FOR SOLUTION INTRAVENOUS at 08:08

## 2021-11-22 RX ADMIN — BENZOCAINE AND LEVOMENTHOL: 200; 5 SPRAY TOPICAL at 23:23

## 2021-11-22 RX ADMIN — Medication: at 18:24

## 2021-11-22 RX ADMIN — SODIUM CHLORIDE, POTASSIUM CHLORIDE, SODIUM LACTATE AND CALCIUM CHLORIDE: 600; 310; 30; 20 INJECTION, SOLUTION INTRAVENOUS at 06:11

## 2021-11-22 RX ADMIN — LIDOCAINE HYDROCHLORIDE,EPINEPHRINE BITARTRATE 3 ML: 15; .005 INJECTION, SOLUTION EPIDURAL; INFILTRATION; INTRACAUDAL; PERINEURAL at 10:45

## 2021-11-22 RX ADMIN — DOCUSATE SODIUM 100 MG: 100 CAPSULE ORAL at 23:23

## 2021-11-22 RX ADMIN — Medication 15 ML/HR: at 10:51

## 2021-11-22 RX ADMIN — Medication 166.7 ML: at 18:35

## 2021-11-22 RX ADMIN — Medication 15 ML/HR: at 17:15

## 2021-11-22 RX ADMIN — HYDRALAZINE HYDROCHLORIDE 5 MG: 20 INJECTION INTRAMUSCULAR; INTRAVENOUS at 20:13

## 2021-11-22 RX ADMIN — LIDOCAINE HYDROCHLORIDE 2 ML: 10 INJECTION, SOLUTION EPIDURAL; INFILTRATION; INTRACAUDAL; PERINEURAL at 10:34

## 2021-11-22 RX ADMIN — Medication 87.3 MILLI-UNITS/MIN: at 18:46

## 2021-11-22 RX ADMIN — LIDOCAINE HYDROCHLORIDE 2 ML: 10 INJECTION, SOLUTION EPIDURAL; INFILTRATION; INTRACAUDAL; PERINEURAL at 10:39

## 2021-11-22 RX ADMIN — Medication 3 ML: at 10:55

## 2021-11-22 RX ADMIN — FERROUS SULFATE TAB 325 MG (65 MG ELEMENTAL FE) 325 MG: 325 (65 FE) TAB at 23:23

## 2021-11-22 RX ADMIN — Medication 5 ML: at 10:52

## 2021-11-22 RX ADMIN — IBUPROFEN 600 MG: 600 TABLET ORAL at 23:23

## 2021-11-22 ASSESSMENT — PAIN - FUNCTIONAL ASSESSMENT
PAIN_FUNCTIONAL_ASSESSMENT: 0-10
PAIN_FUNCTIONAL_ASSESSMENT: 0-10

## 2021-11-22 ASSESSMENT — PAIN SCALES - GENERAL
PAINLEVEL_OUTOF10: 0
PAINLEVEL_OUTOF10: 5

## 2021-11-22 ASSESSMENT — LIFESTYLE VARIABLES: SMOKING_STATUS: 0

## 2021-11-22 NOTE — PROGRESS NOTES
Updated Dr. Katarzyna Bhandari on patient arrival. Contraction irregular and sve 3/80/-2. SROM at 0400 per patient.  New order for vancomycin and start pitocin after first dose of antibiotic

## 2021-11-22 NOTE — PROGRESS NOTES
Patient presents to l&d at 37.6 weeks gestation c/o lof at 0400. Patient states she woke up and felt a gush of fluid. Denies vb. States some cramping that started after the leaking. Good fetal movement. EFM applied. . Amnisure started.

## 2021-11-22 NOTE — PROGRESS NOTES
Dr Alexis Mask updated on pts BPs and lab results. Updated internal monitors were placed. Updated pt had a few declarations that resolved with position change. Updated pitocin has not yet been started.  Will continue with current POC

## 2021-11-22 NOTE — PROGRESS NOTES
Dr Marvin Sharp updated pt is 5/80/-1. Pt would like an epidural. Pitocin has not yet been started. 1st dose of vanc just finished. Order received for epidural. Would like pt checked after epidural and updated in SVE.

## 2021-11-22 NOTE — PROGRESS NOTES
Assumed pt care. Pt resting in bed. Pt states she is feeing occasional ctx but is tolerating them well. POC discussed with pt. Pt agreeable.  No complaints at this time

## 2021-11-22 NOTE — PROGRESS NOTES
Report rec'd from Sharif Alonso RN. Assumed care of pt. Pt resting comfortably in bed on right side with peanut ball. Dr. Mavis Edawrds aware of increased pressures and lab results. NO new orders at this time. 172: Update to Dr. Mavis Edwards on cervical exam.  Order rec'd to recheck pt at 1800 unless pt feels pushy before then. 175: Update to Dr. Mavis Edwards on complete cervical exam.  Will head into hospital ASAP for delivery    1817: MD at bedside    1819: pt prepped and ready for delivery. Baby nurse at bedside    1824:  of viable female infant    5: Bedside report given to Mom Baby night shift nurse.  Care of pt transfered

## 2021-11-22 NOTE — H&P
Department of Obstetrics and Gynecology  Attending Obstetrics History and Physical        CHIEF COMPLAINT: Leaking fluid since 4 AM     HISTORY OF PRESENT ILLNESS:      The patient is a 22 y.o. female [address] [facility], Patient's last menstrual period was 2021.,  at 37w6d. OB History        1    Para   0    Term   0       0    AB   0    Living   0       SAB   0    IAB   0    Ectopic   0    Molar   0    Multiple   0    Live Births   0            Patient presents with a chief complaint as above. Estimated Due Date: Estimated Date of Delivery: 21    PRENATAL CARE:    Complicated by:   Patient Active Problem List   Diagnosis Code    PCOS (polycystic ovarian syndrome) E28.2    Class 3 severe obesity without serious comorbidity with body mass index (BMI) of 40.0 to 44.9 in adult ([facility] [address] E66.01, Z68.41    Type 2 diabetes mellitus without complication, with long-term current use of insulin ([facility] [address] E11.9, Z79.4    Spotting N92.0    Decreased fetal movements in third trimester O36.8130    Decreased fetal movement affecting management of mother, antepartum O36.8190    Amniotic fluid leaking O42.90    37 weeks gestation of pregnancy Z3A.37       PAST OB HISTORY  OB History        1    Para   0    Term   0       0    AB   0    Living   0       SAB   0    IAB   0    Ectopic   0    Molar   0    Multiple   0    Live Births   0                Past Medical History:        Diagnosis Date    Diabetes mellitus ([facility] [address]     type 2    PCOS (polycystic ovarian syndrome)      Past Surgical History:    No past surgical history on file. Social History:    TOBACCO:   reports that she has never smoked. She has never used smokeless tobacco.  ETOH:   reports no history of alcohol use. DRUGS:   reports no history of drug use.   Family History:       Problem Relation Age of Onset    Other Mother         Dermatomyositis    High Blood Pressure Mother     Stroke Mother     No Known Problems Sister     Diabetes Maternal Grandfather     No Known Problems Sister     Breast Cancer Maternal Aunt      Medications Prior to Admission:  Medications Prior to Admission: Prenatal Vit-Fe Fumarate-FA (PRENATAL 1+1 PO), Take by mouth  insulin glargine (LANTUS) 100 UNIT/ML injection vial, Inject 6 Units into the skin nightly  ondansetron (ZOFRAN ODT) 4 MG disintegrating tablet, Take 1 tablet by mouth every 8 hours as needed for Nausea or Vomiting  blood glucose monitor kit and supplies, Dispense insurance-preferred supplies to include: monitor, strips, lancing device, lancets, control solutions, alcohol swabs. Allergies:  Penicillins and Plum pulp    Review of Systems:   Ears, nose, mouth, throat, and face: negative  Respiratory: negative  Cardiovascular: negative  Gastrointestinal: negative  Genitourinary:negative  Integument/breast: negative  Hematologic/lymphatic: negative  Musculoskeletal:negative  Neurological: negative  Behavioral/Psych: negative  Endocrine: negative  Allergic/Immunologic: negative    PHYSICAL EXAM:    General appearance:  awake, alert, cooperative, no apparent distress, and appears stated age  Neurologic:  Awake, alert, oriented to name, place and time. Cranial nerves II-XII are grossly intact. Motor is 5 out of 5 bilaterally. Cerebellar finger to nose, heel to shin intact. Sensory is intact.   Babinski down going, Romberg negative, and gait is normal.  Lungs:  No increased work of breathing, good air exchange, clear to auscultation bilaterally, no crackles or wheezing  Heart:  Normal apical impulse, regular rate and rhythm, normal S1 and S2, no S3 or S4, and no murmur noted  Abdomen:  No scars, normal bowel sounds, soft, non-distended, non-tender, no masses palpated, no hepatosplenomegally  Fetal heart rate:  Baseline Heart Rate 140, accelerations: Present, decelerations: Absent  Pelvis:  External Genitalia: General appearance; normal, Hair distribution; normal, Lesions absent  Cervix:    DILATION: 3 cm  EFFACEMENT:   80%  STATION: -2 cm      Contraction frequency: Irregular minutes  Membranes: Ruptured AmniSure positive clear fluid    /76   Pulse 85   Temp 98.4 °F (36.9 °C) (Oral)   Resp 16   Ht 5' 4\" (1.626 m)   Wt 220 lb (99.8 kg)   LMP 01/16/2021   BMI 37.76 kg/m²     General Labs:    CBC:   Lab Results   Component Value Date    WBC 8.9 11/22/2021    RBC 4.31 11/22/2021    HGB 10.1 11/22/2021    HCT 31.9 11/22/2021    MCV 74.0 11/22/2021    RDW 14.8 11/22/2021     11/22/2021     CMP:    Lab Results   Component Value Date     05/22/2021    K 4.1 05/22/2021    K 3.5 08/11/2019     05/22/2021    CO2 23 05/22/2021    BUN 11 05/22/2021    PROT 7.3 05/22/2021     U/A:  No components found for: Christian Truman, USPGRAV, UPH, General Overlie, UKETONE, UBILI, UBLOOD, UNITRITE, UUROBIL, ULEUKEST, USQEPI, [place], UWBC, [place], [place], [place]    ASSESSMENT AND PLAN:  IUP at 37-6/7 weeks  Spontaneous rupture membranes at 4 AM  GBS positive, penicillin allergy  Dr. Liliana Paul notified      Electronically signed by Ferdinand Robledo MD on 11/22/2021 at 6:33 AM

## 2021-11-22 NOTE — ANESTHESIA PRE PROCEDURE
Department of Anesthesiology  Preprocedure Note       Name:  Bernard Rivera   Age:  22 y.o.  :  2000                                          MRN:  [id number]         Date:  2021      Surgeon: * No surgeons listed *    Procedure: * No procedures listed *    Medications prior to admission:   Prior to Admission medications    Medication Sig Start Date End Date Taking? Authorizing Provider   Prenatal Vit-Fe Fumarate-FA (PRENATAL 1+1 PO) Take by mouth   Yes Historical Provider, MD   insulin glargine (LANTUS) 100 UNIT/ML injection vial Inject 6 Units into the skin nightly   Yes Historical Provider, MD   ondansetron (ZOFRAN ODT) 4 MG disintegrating tablet Take 1 tablet by mouth every 8 hours as needed for Nausea or Vomiting 21  DEDE Morton - CNP   blood glucose monitor kit and supplies Dispense insurance-preferred supplies to include: monitor, strips, lancing device, lancets, control solutions, alcohol swabs. 21   Anupama Bentley MD       Current medications:    Current Facility-Administered Medications   Medication Dose Route Frequency Provider Last Rate Last Admin    lactated ringers infusion   IntraVENous Continuous Olegario Albert  mL/hr at 21 0611 New Bag at 21 0611    lactated ringers bolus  500 mL IntraVENous PRN Olegario Albert MD        Or    lactated ringers bolus  1,000 mL IntraVENous PRN Olegario Albert MD        ondansetron [facility]) injection 4 mg  4 mg IntraVENous Q6H PRN Olegario Albert MD        oxytocin (PITOCIN) 30 units in 500 mL infusion  1-20 ade-units/min IntraVENous Continuous Olegario Albert MD        vancomycin 1000 mg IVPB in 250 mL D5W addavial  1,000 mg IntraVENous Q12H Olegario Albert  mL/hr at 21 0808 1,000 mg at 21 0808    povidone-iodine (BETADINE) 10 % external solution             lidocaine 1 % injection                Allergies:     Allergies   Allergen Reactions    Penicillins     Plum Pulp Swelling       Problem List:    Patient Active Problem List   Diagnosis Code    PCOS (polycystic ovarian syndrome) E28.2    Class 3 severe obesity without serious comorbidity with body mass index (BMI) of 40.0 to 44.9 in adult ([facility] [address] E66.01, Z68.41    Type 2 diabetes mellitus without complication, with long-term current use of insulin ([facility] [address] E11.9, Z79.4    Spotting N92.0    Decreased fetal movements in third trimester O36.8130    Decreased fetal movement affecting management of mother, antepartum O36.8190    Amniotic fluid leaking O42.90    37 weeks gestation of pregnancy Z3A.37       Past Medical History:        Diagnosis Date    Diabetes mellitus ([facility] [address]     type 2    PCOS (polycystic ovarian syndrome)        Past Surgical History:  No past surgical history on file. Social History:    Social History     Tobacco Use    Smoking status: Never Smoker    Smokeless tobacco: Never Used   Substance Use Topics    Alcohol use: No                                Counseling given: Not Answered      Vital Signs (Current):   Vitals:    11/22/21 0536 11/22/21 0554 11/22/21 0600 11/22/21 0741   BP: (!) 144/72 136/76  (!) 141/80   Pulse: 75 85  78   Resp: 16   16   Temp: 36.9 °C (98.4 °F)      TempSrc: Oral      Weight:   220 lb (99.8 kg)    Height:   5' 4\" (1.626 m)                                               BP Readings from Last 3 Encounters:   11/22/21 (!) 141/80   08/24/21 121/60   05/22/21 125/80       NPO Status:  NPO since 11/21/2021 at 21:00. BMI:   Wt Readings from Last 3 Encounters:   11/22/21 220 lb (99.8 kg)   08/24/21 230 lb (104.3 kg)   05/22/21 180 lb (81.6 kg)     Body mass index is 37.76 kg/m².     CBC:   Lab Results   Component Value Date    WBC 8.9 11/22/2021    RBC 4.31 11/22/2021    HGB 10.1 11/22/2021    HCT 31.9 11/22/2021    MCV 74.0 11/22/2021    RDW 14.8 11/22/2021     11/22/2021       CMP:   Lab Results Component Value Date     05/22/2021    K 4.1 05/22/2021    K 3.5 08/11/2019     05/22/2021    CO2 23 05/22/2021    BUN 11 05/22/2021    CREATININE 0.4 05/22/2021    GFRAA >60 05/22/2021    LABGLOM >60 05/22/2021    GLUCOSE 118 05/22/2021    PROT 7.3 05/22/2021    CALCIUM 10.1 05/22/2021    BILITOT <0.2 05/22/2021    ALKPHOS 68 05/22/2021    AST 27 05/22/2021    ALT 28 05/22/2021       POC Tests: No results for input(s): POCGLU, POCNA, POCK, POCCL, POCBUN, POCHEMO, POCHCT in the last 72 hours. Coags: No results found for: PROTIME, INR, APTT    HCG (If Applicable):   Lab Results   Component Value Date    PREGTESTUR POSITIVE 05/22/2021        ABGs: No results found for: PHART, PO2ART, EQD0IEK, XJY1ANL, BEART, C5SUNSKP     Type & Screen (If Applicable):  No results found for: LABABO, LABRH    Drug/Infectious Status (If Applicable):  No results found for: HIV, HEPCAB    COVID-19 Screening (If Applicable):   Lab Results   Component Value Date    COVID19 DETECTED 12/22/2020           Anesthesia Evaluation  Patient summary reviewed and Nursing notes reviewed no history of anesthetic complications:   Airway: Mallampati: III  TM distance: >3 FB   Neck ROM: full  Mouth opening: > = 3 FB Dental: normal exam         Pulmonary:Negative Pulmonary ROS breath sounds clear to auscultation      (-) not a current smoker                           Cardiovascular:            Rhythm: regular  Rate: normal           Beta Blocker:  Not on Beta Blocker         Neuro/Psych:   Negative Neuro/Psych ROS              GI/Hepatic/Renal: Neg GI/Hepatic/Renal ROS            Endo/Other:    (+) DiabetesType II DM, using insulin, . ROS comment: PCOS (polycystic ovarian syndrome) Abdominal:   (+) obese,           Vascular: negative vascular ROS. Other Findings:             Anesthesia Plan      epidural     ASA 2           MIPS: Prophylactic antiemetics administered.   Anesthetic plan and risks discussed with patient. Use of blood products discussed with patient whom consented to blood products. Plan discussed with CRNA and attending.                 Nicki Daniels RN   11/22/2021

## 2021-11-23 LAB
HCT VFR BLD CALC: 28.1 % (ref 34–48)
HEMOGLOBIN: 8.8 G/DL (ref 11.5–15.5)
MCH RBC QN AUTO: 23.1 PG (ref 26–35)
MCHC RBC AUTO-ENTMCNC: 31.3 % (ref 32–34.5)
MCV RBC AUTO: 73.8 FL (ref 80–99.9)
METER GLUCOSE: 124 MG/DL (ref 74–99)
PDW BLD-RTO: 14.9 FL (ref 11.5–15)
PLATELET # BLD: 239 E9/L (ref 130–450)
PMV BLD AUTO: 11.5 FL (ref 7–12)
RBC # BLD: 3.81 E12/L (ref 3.5–5.5)
WBC # BLD: 11.3 E9/L (ref 4.5–11.5)

## 2021-11-23 PROCEDURE — 85027 COMPLETE CBC AUTOMATED: CPT

## 2021-11-23 PROCEDURE — 1220000000 HC SEMI PRIVATE OB R&B

## 2021-11-23 PROCEDURE — 36415 COLL VENOUS BLD VENIPUNCTURE: CPT

## 2021-11-23 PROCEDURE — 7200000001 HC VAGINAL DELIVERY

## 2021-11-23 PROCEDURE — 6370000000 HC RX 637 (ALT 250 FOR IP): Performed by: OBSTETRICS & GYNECOLOGY

## 2021-11-23 PROCEDURE — 82962 GLUCOSE BLOOD TEST: CPT

## 2021-11-23 RX ADMIN — IBUPROFEN 600 MG: 600 TABLET ORAL at 08:36

## 2021-11-23 RX ADMIN — DOCUSATE SODIUM 100 MG: 100 CAPSULE ORAL at 08:36

## 2021-11-23 RX ADMIN — IBUPROFEN 600 MG: 600 TABLET ORAL at 17:48

## 2021-11-23 RX ADMIN — METFORMIN HYDROCHLORIDE 500 MG: 500 TABLET ORAL at 17:48

## 2021-11-23 RX ADMIN — METFORMIN HYDROCHLORIDE 500 MG: 500 TABLET ORAL at 10:04

## 2021-11-23 RX ADMIN — FERROUS SULFATE TAB 325 MG (65 MG ELEMENTAL FE) 325 MG: 325 (65 FE) TAB at 17:48

## 2021-11-23 RX ADMIN — FERROUS SULFATE TAB 325 MG (65 MG ELEMENTAL FE) 325 MG: 325 (65 FE) TAB at 08:36

## 2021-11-23 ASSESSMENT — PAIN SCALES - GENERAL
PAINLEVEL_OUTOF10: 2
PAINLEVEL_OUTOF10: 3

## 2021-11-23 NOTE — PROGRESS NOTES
Department of Obstetrics and Gynecology  Labor and Delivery  Attending Post Partum Progress Note      SUBJECTIVE:  No Complaints. OBJECTIVE:     Vitals:  /62   Pulse 77   Temp 98.1 °F (36.7 °C) (Oral)   Resp 14   Ht 5' 4\" (1.626 m)   Wt 220 lb (99.8 kg)   LMP 01/16/2021   SpO2 99%   Breastfeeding Unknown   BMI 37.76 kg/m²     Uterus:  Firm    DATA:      CBC:   Lab Results   Component Value Date    WBC 11.3 11/23/2021    RBC 3.81 11/23/2021    HGB 8.8 11/23/2021    HCT 28.1 11/23/2021    MCV 73.8 11/23/2021    RDW 14.9 11/23/2021     11/23/2021       ASSESSMENT: PPD #1    PLAN: Routine PP Care. Check 2h PPBS after Lunch today. Resume Metformin.     Eloisa Blair MD, Sarika Hassan

## 2021-11-23 NOTE — PROGRESS NOTES
Pt resting in bed bonding w/ baby; will accept offered motrin for cramping; denies any s/s PIH; denies any s/s hi/low blood sugar.

## 2021-11-24 VITALS
OXYGEN SATURATION: 99 % | HEART RATE: 88 BPM | TEMPERATURE: 98.4 F | RESPIRATION RATE: 14 BRPM | SYSTOLIC BLOOD PRESSURE: 151 MMHG | DIASTOLIC BLOOD PRESSURE: 72 MMHG | WEIGHT: 220 LBS | HEIGHT: 64 IN | BODY MASS INDEX: 37.56 KG/M2

## 2021-11-24 LAB
C. TRACHOMATIS DNA ,URINE: ABNORMAL
N. GONORRHOEAE DNA, URINE: NEGATIVE
SOURCE: ABNORMAL

## 2021-11-24 PROCEDURE — 6370000000 HC RX 637 (ALT 250 FOR IP): Performed by: OBSTETRICS & GYNECOLOGY

## 2021-11-24 RX ORDER — HYDROCODONE BITARTRATE AND ACETAMINOPHEN 5; 325 MG/1; MG/1
1 TABLET ORAL EVERY 6 HOURS PRN
Qty: 12 TABLET | Refills: 0 | Status: SHIPPED | OUTPATIENT
Start: 2021-11-24 | End: 2021-11-27

## 2021-11-24 RX ORDER — FERROUS SULFATE 325(65) MG
325 TABLET ORAL 2 TIMES DAILY WITH MEALS
Qty: 30 TABLET | Refills: 1 | Status: SHIPPED | OUTPATIENT
Start: 2021-11-24 | End: 2022-06-06

## 2021-11-24 RX ORDER — IBUPROFEN 600 MG/1
600 TABLET ORAL EVERY 6 HOURS PRN
Qty: 60 TABLET | Refills: 1 | Status: SHIPPED | OUTPATIENT
Start: 2021-11-24

## 2021-11-24 RX ADMIN — FERROUS SULFATE TAB 325 MG (65 MG ELEMENTAL FE) 325 MG: 325 (65 FE) TAB at 08:58

## 2021-11-24 RX ADMIN — METFORMIN HYDROCHLORIDE 500 MG: 500 TABLET ORAL at 08:58

## 2021-11-24 RX ADMIN — IBUPROFEN 600 MG: 600 TABLET ORAL at 08:58

## 2021-11-24 RX ADMIN — DOCUSATE SODIUM 100 MG: 100 CAPSULE ORAL at 08:58

## 2021-11-24 ASSESSMENT — PAIN SCALES - GENERAL: PAINLEVEL_OUTOF10: 2

## 2021-11-24 NOTE — FLOWSHEET NOTE
Discharge teaching and instructions given to patient on self and . Safe sleep reviewed for      Patient aware to call to make follow up appointments for self and .

## 2022-01-03 NOTE — ANESTHESIA POSTPROCEDURE EVALUATION
Department of Anesthesiology  Postprocedure Note    Patient: Tamera Pickett  MRN: [id number]  YOB: 2000  Date of evaluation: 11/23/2021  Time:  9:44 AM     Procedure Summary     Date: 11/22/21 Room / Location:     Anesthesia Start: [address] [facility] Anesthesia Stop: 1824    Procedure: Labor Analgesia Diagnosis:     Scheduled Providers:  Responsible Provider: Michelle Rowley MD    Anesthesia Type: epidural ASA Status: 2          Anesthesia Type: epidural    Patsy Phase I:      Patsy Phase II:      Last vitals: Reviewed and per EMR flowsheets.        Anesthesia Post Evaluation    Patient location during evaluation: bedside  Patient participation: complete - patient participated  Level of consciousness: awake and alert  Pain score: 0  Airway patency: patent  Nausea & Vomiting: no nausea and no vomiting  Complications: no  Cardiovascular status: hemodynamically stable  Respiratory status: spontaneous ventilation and room air  Hydration status: stable
2-4 times/mo

## 2022-06-06 ENCOUNTER — OFFICE VISIT (OUTPATIENT)
Dept: FAMILY MEDICINE CLINIC | Age: 22
End: 2022-06-06
Payer: COMMERCIAL

## 2022-06-06 VITALS
TEMPERATURE: 97.8 F | BODY MASS INDEX: 38.76 KG/M2 | HEIGHT: 64 IN | HEART RATE: 72 BPM | OXYGEN SATURATION: 98 % | RESPIRATION RATE: 16 BRPM | DIASTOLIC BLOOD PRESSURE: 103 MMHG | SYSTOLIC BLOOD PRESSURE: 134 MMHG | WEIGHT: 227 LBS

## 2022-06-06 DIAGNOSIS — Z79.4 TYPE 2 DIABETES MELLITUS WITHOUT COMPLICATION, WITH LONG-TERM CURRENT USE OF INSULIN (HCC): Primary | ICD-10-CM

## 2022-06-06 DIAGNOSIS — E11.9 TYPE 2 DIABETES MELLITUS WITHOUT COMPLICATION, WITH LONG-TERM CURRENT USE OF INSULIN (HCC): Primary | ICD-10-CM

## 2022-06-06 DIAGNOSIS — I10 PRIMARY HYPERTENSION: ICD-10-CM

## 2022-06-06 PROCEDURE — 2022F DILAT RTA XM EVC RTNOPTHY: CPT | Performed by: FAMILY MEDICINE

## 2022-06-06 PROCEDURE — 99214 OFFICE O/P EST MOD 30 MIN: CPT | Performed by: FAMILY MEDICINE

## 2022-06-06 PROCEDURE — 83036 HEMOGLOBIN GLYCOSYLATED A1C: CPT | Performed by: FAMILY MEDICINE

## 2022-06-06 PROCEDURE — G8427 DOCREV CUR MEDS BY ELIG CLIN: HCPCS | Performed by: FAMILY MEDICINE

## 2022-06-06 PROCEDURE — 1036F TOBACCO NON-USER: CPT | Performed by: FAMILY MEDICINE

## 2022-06-06 PROCEDURE — 3046F HEMOGLOBIN A1C LEVEL >9.0%: CPT | Performed by: FAMILY MEDICINE

## 2022-06-06 PROCEDURE — G8417 CALC BMI ABV UP PARAM F/U: HCPCS | Performed by: FAMILY MEDICINE

## 2022-06-06 RX ORDER — HYDROCHLOROTHIAZIDE 25 MG/1
25 TABLET ORAL EVERY MORNING
Qty: 30 TABLET | Refills: 0 | Status: SHIPPED | OUTPATIENT
Start: 2022-06-06

## 2022-06-06 RX ORDER — HYDROCHLOROTHIAZIDE 25 MG/1
25 TABLET ORAL EVERY MORNING
Qty: 30 TABLET | Refills: 0 | Status: SHIPPED
Start: 2022-06-06 | End: 2022-06-06

## 2022-06-06 SDOH — ECONOMIC STABILITY: FOOD INSECURITY: WITHIN THE PAST 12 MONTHS, THE FOOD YOU BOUGHT JUST DIDN'T LAST AND YOU DIDN'T HAVE MONEY TO GET MORE.: NEVER TRUE

## 2022-06-06 SDOH — ECONOMIC STABILITY: FOOD INSECURITY: WITHIN THE PAST 12 MONTHS, YOU WORRIED THAT YOUR FOOD WOULD RUN OUT BEFORE YOU GOT MONEY TO BUY MORE.: NEVER TRUE

## 2022-06-06 ASSESSMENT — PATIENT HEALTH QUESTIONNAIRE - PHQ9
1. LITTLE INTEREST OR PLEASURE IN DOING THINGS: 0
SUM OF ALL RESPONSES TO PHQ QUESTIONS 1-9: 0
2. FEELING DOWN, DEPRESSED OR HOPELESS: 0
SUM OF ALL RESPONSES TO PHQ9 QUESTIONS 1 & 2: 0
SUM OF ALL RESPONSES TO PHQ QUESTIONS 1-9: 0

## 2022-06-06 ASSESSMENT — ENCOUNTER SYMPTOMS
DIARRHEA: 0
NAUSEA: 0
COUGH: 0
ABDOMINAL PAIN: 0
SHORTNESS OF BREATH: 0
CONSTIPATION: 0
VOMITING: 0

## 2022-06-06 ASSESSMENT — SOCIAL DETERMINANTS OF HEALTH (SDOH): HOW HARD IS IT FOR YOU TO PAY FOR THE VERY BASICS LIKE FOOD, HOUSING, MEDICAL CARE, AND HEATING?: NOT HARD AT ALL

## 2022-06-06 NOTE — PROGRESS NOTES
6/6/22    Damian Rodriguez is a 25 y.o. female here for:    HPI:    Diabetes  Checking sugars at home? yes  Average: 110-120  What medications are you on? Metformin  mg BID- on for PCOS as well Are you taking your medications every day? yes  Any numbness or tingling in hands or feet? No  Any noticeable wounds on your feet? no  Have you had an eye exam in the last year? No- April 2021   Hb A1c- 6.1%; was never in diabetic range     Hypertension   Does not take BP at home  Started with pregnancy  No medications prior    Got hot and dizziness, passed out on 6/3, palpitations- was putting up a trampoline. Was drinking water, did not eat. Took 5 mins to recover fully, was only out for a few seconds, no bowel or bladder incontinence   No blurred vision, no headaches     BP Readings from Last 3 Encounters:   06/06/22 (!) 134/103   11/24/21 (!) 151/72   08/24/21 121/60     Current Outpatient Medications   Medication Sig Dispense Refill    hydroCHLOROthiazide (HYDRODIURIL) 25 MG tablet Take 1 tablet by mouth every morning 30 tablet 0    ibuprofen (ADVIL;MOTRIN) 600 MG tablet Take 1 tablet by mouth every 6 hours as needed for Pain 60 tablet 1    metFORMIN (GLUCOPHAGE) 500 MG tablet Take 1 tablet by mouth 2 times daily (with meals) 60 tablet 3     No current facility-administered medications for this visit. Allergies   Allergen Reactions    Penicillins     Plum Pulp Swelling       Past Medical & Surgical History:      Diagnosis Date    Diabetes mellitus ([address]     type 2    PCOS (polycystic ovarian syndrome)      No past surgical history on file.     Family History:      Problem Relation Age of Onset    Other Mother         Dermatomyositis    High Blood Pressure Mother     Stroke Mother     No Known Problems Sister     Diabetes Maternal Grandfather     No Known Problems Sister     Breast Cancer Maternal Aunt        Social History:  Social History     Tobacco Use    Smoking status: Never Smoker    Smokeless tobacco: Never Used   Substance Use Topics    Alcohol use: No       There is no immunization history for the selected administration types on file for this patient. Review of Systems   Constitutional: Negative for appetite change and fatigue. HENT: Negative for congestion and sneezing. Respiratory: Negative for cough and shortness of breath. Cardiovascular: Negative for chest pain. Gastrointestinal: Negative for abdominal pain, constipation, diarrhea, nausea and vomiting. Genitourinary: Negative for dysuria. Musculoskeletal: Negative for arthralgias. Neurological: Positive for syncope. Negative for dizziness, numbness and headaches. Psychiatric/Behavioral: The patient is not nervous/anxious. VS:  BP (!) 134/103   Pulse 72   Temp 97.8 °F (36.6 °C) (Temporal)   Resp 16   Ht 5' 4\" (1.626 m)   Wt 227 lb (103 kg)   SpO2 98%   BMI 38.96 kg/m²     Physical Exam  Vitals reviewed. Constitutional:       Appearance: Normal appearance. She is not ill-appearing. HENT:      Head: Normocephalic. Mouth/Throat:      Mouth: Mucous membranes are moist.   Eyes:      Pupils: Pupils are equal, round, and reactive to light. Cardiovascular:      Rate and Rhythm: Normal rate and regular rhythm. Heart sounds: Normal heart sounds. No murmur heard. Pulmonary:      Effort: Pulmonary effort is normal.      Breath sounds: Normal breath sounds. No wheezing or rhonchi. Musculoskeletal:      Right lower leg: No edema. Left lower leg: No edema. Skin:     General: Skin is warm. Capillary Refill: Capillary refill takes less than 2 seconds. Coloration: Skin is not pale. Neurological:      General: No focal deficit present. Mental Status: She is alert and oriented to person, place, and time. Cranial Nerves: No cranial nerve deficit. Motor: No weakness.       Coordination: Coordination normal. Finger-Nose-Finger Test and Heel to [facility] Test normal. Psychiatric:         Mood and Affect: Mood normal.         Behavior: Behavior normal.         Thought Content: Thought content normal.         Judgment: Judgment normal.         Assessment/Plan:  1. Type 2 diabetes mellitus without complication, with long-term current use of insulin (HCC)  Well controlled. Continue Metformin 500 mg XR BID. Counseled on weight loss  - POCT glycosylated hemoglobin (Hb A1C)- 6.1     2. Primary hypertension  Elevated the last two readings. Family hx positive. Will treat with HCTZ  - hydroCHLOROthiazide (HYDRODIURIL) 25 MG tablet; Take 1 tablet by mouth every morning  Dispense: 30 tablet; Refill: 0      Return to office: Return in about 4 weeks (around 7/4/2022) for Hypertension. Counseled regarding above diagnosis, including possible risks and complications, especially if left uncontrolled. I encourage you to do some reading and education about your health. The [employer] provides information on many health conditions:http://familydoctor. org     Counseled regarding the possible side effects, risks, benefits and alternatives to treatment; patient and/or guardian verbalizes understanding, agrees, feels comfortable with and wishes to proceed with above treatment plan. Advised patient to call with any new medication issues, and read all Rx info from pharmacy to assure aware of all possible risks and side effects of medication before taking. Reviewed age and gender appropriate health screening exams and vaccinations. Advised patient regarding importance of keeping up with recommended health maintenance and to schedule as soon as possible if overdue, as this is important in assessing for undiagnosed pathology, especially cancer, as well as protecting against potentially harmful/life threatening disease. Patient and/or guardian verbalizes understanding and agrees with above counseling, assessment and plan.      Jenelle Arriaga MD  Family Medicine PGY-3

## 2022-06-06 NOTE — PROGRESS NOTES
S: 25 y.o. female with   Chief Complaint   Patient presents with    Diabetes       Diabetes  -f/u  -on metformin  -checking sugars at home, 110-120 fasting  -denies any numbness or tingling or visual changes   -has not seen eye doctor in more than a year    HTN  -f/u  -hx of elevated BP  -was elevated during pregnancy also  -never taken medication for this     Syncope  -new issue  -happened several days ago  -denies any seizure like activity  -was unconscious for seconds, then came to quickly   -denies any chest pain  -felt hot, got dizzy and hot went into cold air and passed out     O: VS:  height is 5' 4\" (1.626 m) and weight is 227 lb (103 kg). Her temporal temperature is 97.8 °F (36.6 °C). Her blood pressure is 134/103 (abnormal) and her pulse is 72. Her respiration is 16 and oxygen saturation is 98%. BP Readings from Last 3 Encounters:   06/06/22 (!) 134/103   11/24/21 (!) 151/72   08/24/21 121/60     See resident note  Neuro exam normal     Impression/Plan:   1) DM - Cont Metformin  2) HTN - Start HCTZ, recheck in 4 weeks  3) Syncope - sounds vasovagal, watch for now, if happens again needs further worked up       Health Maintenance Due   Topic Date Due    Varicella vaccine (1 of 2 - 2-dose childhood series) Never done    COVID-19 Vaccine (1) Never done    Pneumococcal 0-64 years Vaccine (1 - PCV) Never done    HPV vaccine (1 - 2-dose series) Never done    HIV screen  Never done    Diabetic retinal exam  Never done    Hepatitis C screen  Never done    Hepatitis B vaccine (1 of 3 - Risk 3-dose series) Never done    DTaP/Tdap/Td vaccine (1 - Tdap) Never done    Pap smear  Never done    Lipids  01/06/2022    Depression Screen  01/06/2022    Diabetic foot exam  04/20/2022    A1C test (Diabetic or Prediabetic)  04/20/2022    Diabetic microalbuminuria test  04/20/2022         Attending Physician Statement  I have discussed the case, including pertinent history and exam findings with the resident. I agree with the documented assessment and plan.       Marquis Barrera, DO